# Patient Record
Sex: MALE | Race: WHITE | NOT HISPANIC OR LATINO | Employment: FULL TIME | URBAN - METROPOLITAN AREA
[De-identification: names, ages, dates, MRNs, and addresses within clinical notes are randomized per-mention and may not be internally consistent; named-entity substitution may affect disease eponyms.]

---

## 2021-06-21 ENCOUNTER — APPOINTMENT (OUTPATIENT)
Dept: GENERAL RADIOLOGY | Facility: CLINIC | Age: 61
End: 2021-06-21
Attending: FAMILY MEDICINE
Payer: OTHER MISCELLANEOUS

## 2021-06-21 ENCOUNTER — HOSPITAL ENCOUNTER (EMERGENCY)
Facility: CLINIC | Age: 61
Discharge: HOME | End: 2021-06-21
Attending: FAMILY MEDICINE | Admitting: FAMILY MEDICINE
Payer: OTHER MISCELLANEOUS

## 2021-06-21 VITALS
HEIGHT: 73 IN | SYSTOLIC BLOOD PRESSURE: 125 MMHG | RESPIRATION RATE: 18 BRPM | BODY MASS INDEX: 25.98 KG/M2 | WEIGHT: 196 LBS | OXYGEN SATURATION: 97 % | TEMPERATURE: 98.2 F | HEART RATE: 79 BPM | DIASTOLIC BLOOD PRESSURE: 79 MMHG

## 2021-06-21 DIAGNOSIS — S46.911A STRAIN OF RIGHT SHOULDER, INITIAL ENCOUNTER: ICD-10-CM

## 2021-06-21 PROCEDURE — 99283 EMERGENCY DEPT VISIT LOW MDM: CPT | Performed by: FAMILY MEDICINE

## 2021-06-21 PROCEDURE — 73030 X-RAY EXAM OF SHOULDER: CPT | Mod: RT

## 2021-06-21 PROCEDURE — 99284 EMERGENCY DEPT VISIT MOD MDM: CPT | Performed by: FAMILY MEDICINE

## 2021-06-21 RX ORDER — MULTIVITAMIN,THERAPEUTIC
1 TABLET ORAL DAILY
COMMUNITY

## 2021-06-21 RX ORDER — OXYCODONE AND ACETAMINOPHEN 5; 325 MG/1; MG/1
1 TABLET ORAL EVERY 6 HOURS PRN
Qty: 6 TABLET | Refills: 0 | Status: SHIPPED | OUTPATIENT
Start: 2021-06-21

## 2021-06-21 ASSESSMENT — MIFFLIN-ST. JEOR: SCORE: 1747.93

## 2021-06-21 NOTE — ED PROVIDER NOTES
"  History     Chief Complaint   Patient presents with     Shoulder Injury     HPI  Daniel J Gellerman is a 61 year old male who presents to the emergency department with right shoulder injury.  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  He has pain in the posterior upper shoulder region.  He particularly has pain if he tries to raise his arm over his head.  He gets a little numbness and tingling into his hand with certain arm movements.  No neck pain.  No neck injury.  No fall.  States he is usually in good health.    Allergies:  No Known Allergies    Problem List:    There are no active problems to display for this patient.       Past Medical History:    History reviewed. No pertinent past medical history.    Past Surgical History:    History reviewed. No pertinent surgical history.    Family History:    History reviewed. No pertinent family history.    Social History:  Marital Status:   [4]  Social History     Tobacco Use     Smoking status: Current Every Day Smoker     Types: Cigarettes     Smokeless tobacco: Never Used   Substance Use Topics     Alcohol use: Yes     Drug use: Yes     Types: Marijuana        Medications:    multivitamin, therapeutic (THERA-VIT) TABS tablet  oxyCODONE-acetaminophen (PERCOCET) 5-325 MG tablet          Review of Systems   All other systems reviewed and are negative.      Physical Exam   BP: 125/79  Pulse: 79  Temp: 98.2  F (36.8  C)  Resp: 18  Height: 185.4 cm (6' 1\")  Weight: 88.9 kg (196 lb)  SpO2: 97 %      Physical Exam  Vitals signs and nursing note reviewed.   Constitutional:       Comments: Alert pleasant thin 61-year-old who appears in no acute distress./79   Pulse 79   Temp 98.2  F (36.8  C) (Oral)   Resp 18   Ht 1.854 m (6' 1\")   Wt 88.9 kg (196 lb)   SpO2 97%   BMI 25.86 kg/m       HENT:      Head: Normocephalic and atraumatic.      Nose: Nose normal.   Eyes:     "  Conjunctiva/sclera: Conjunctivae normal.   Neck:      Musculoskeletal: Normal range of motion.   Cardiovascular:      Rate and Rhythm: Normal rate.      Pulses: Normal pulses.   Musculoskeletal:      Comments: Right shoulder as compared to the left.  Normal anatomic architecture and landmarks.  He has tenderness to the supraspinatus region of his right shoulder.  He is able to abduct his arm to about 90 degrees and then has discomfort in the supraspinatus region.  He is unable to fully externally rotate his shoulder due to pain.  He is able to fully internally rotate and touch the small of his back.  His bicep muscle is identical in size to the left and he has no bicep tenderness no evidence of rupture.  Distal CMS is intact.  Range of motion and motion has a nice fluid motion without any crepitus or grinding.   Skin:     General: Skin is warm and dry.      Capillary Refill: Capillary refill takes less than 2 seconds.   Neurological:      General: No focal deficit present.      Mental Status: He is oriented to person, place, and time.   Psychiatric:         Mood and Affect: Mood normal.         Behavior: Behavior normal.         ED Course        Procedures               Critical Care time:  none               Results for orders placed or performed during the hospital encounter of 06/21/21 (from the past 24 hour(s))   XR Shoulder Right G/E 3 Views    Narrative    EXAM: XR SHOULDER RIGHT G/E 3 VIEWS  LOCATION: [facility]  DATE/TIME: 6/21/2021 5:03 PM    INDICATION: Trauma, pain.  COMPARISON: None.      Impression    IMPRESSION: Mild degenerative changes glenohumeral and acromioclavicular joints. No acute fracture or dislocation.       Medications - No data to display    Assessments & Plan (with Medical Decision Making)   [facility]--61-year-old who injured his shoulder at work while pulling on a 0 turn tractor.  There was no fall.  There is no sudden load.  He has tenderness and discomfort to the supraspinatus  region and has pain and discomfort with external rotation of the right shoulder consistent with a rotator cuff strain.  Had no prior shoulder problems or injuries.  X-ray is unremarkable.  Findings discussed with patient.  Recommended ice rest and ibuprofen 6 to 800 mg 3 times a day with food.  He was given 12 tablets of Percocet to be used cautiously and not to be used with driving or used at work.  He can take 1 every 6 hours as needed for severe pain.  Gentle return of range of motion.  Follow-up with his primary care provider or sports medicine if he has ongoing problems or issues or is unable to work full duty in 3 days.  I have reviewed the nursing notes.    I have reviewed the findings, diagnosis, plan and need for follow up with the patient.       Discharge Medication List as of 6/21/2021  5:43 PM      START taking these medications    Details   oxyCODONE-acetaminophen (PERCOCET) 5-325 MG tablet Take 1 tablet by mouth every 6 hours as needed for severe pain, Disp-6 tablet, R-0, Local Print             Final diagnoses:   Strain of right shoulder, initial encounter       6/21/2021   [facility] EMERGENCY DEPT     Kathryn, Khari GARCIA MD  06/21/21 9127       Kathryn, Khari GARCIA MD  06/21/21 5829

## 2021-06-21 NOTE — LETTER
[facility] EMERGENCY DEPT  [address] [place] [address]  Phone: [phone]  Fax: [phone]    June 21, 2021        Daniel J Gellerman  [id number] [facility] [place] [address]          To whom it may concern:    RE: Daniel J Gellerman    Patient was seen and treated today at our emergency department.  I have recommended that he take today and tomorrow off work.  He can return Wednesday if his shoulder is feeling better.  Would recommend no heavy lifting for a week.  He needs follow-up with his clinic physician if he is unable to work full duty in 1 week.        Sincerely,      Khari Peralta MD

## 2021-06-21 NOTE — DISCHARGE INSTRUCTIONS
Your history and exam is consistent with a mild strain to the shoulder.  These usually involve the rotator cuff.  Ice, rest, sling if needed for discomfort.  Take ibuprofen 600 to 800 mg 3 times a day with food.  Take Percocet only as needed for severe pain.  Do not mix with alcohol driving or work.  It is constipating and addictive and you should be off of it within 2 to 3 days.

## 2024-07-05 ENCOUNTER — APPOINTMENT (OUTPATIENT)
Dept: CT IMAGING | Facility: CLINIC | Age: 64
End: 2024-07-05
Attending: EMERGENCY MEDICINE
Payer: COMMERCIAL

## 2024-07-05 ENCOUNTER — HOSPITAL ENCOUNTER (EMERGENCY)
Facility: CLINIC | Age: 64
Discharge: HOME | End: 2024-07-05
Attending: EMERGENCY MEDICINE | Admitting: EMERGENCY MEDICINE
Payer: COMMERCIAL

## 2024-07-05 VITALS
RESPIRATION RATE: 18 BRPM | HEART RATE: 60 BPM | TEMPERATURE: 97.8 F | DIASTOLIC BLOOD PRESSURE: 95 MMHG | SYSTOLIC BLOOD PRESSURE: 160 MMHG | OXYGEN SATURATION: 99 %

## 2024-07-05 DIAGNOSIS — I10 ACCELERATED HYPERTENSION: ICD-10-CM

## 2024-07-05 DIAGNOSIS — K52.9 NON-SPECIFIC COLITIS: ICD-10-CM

## 2024-07-05 LAB
ALBUMIN SERPL BCG-MCNC: 4.5 G/DL (ref 3.5–5.2)
ALBUMIN UR-MCNC: NEGATIVE MG/DL
ALP SERPL-CCNC: 76 U/L (ref 40–150)
ALT SERPL W P-5'-P-CCNC: 33 U/L (ref 0–70)
AMPHETAMINES UR QL SCN: ABNORMAL
ANION GAP SERPL CALCULATED.3IONS-SCNC: 11 MMOL/L (ref 7–15)
APPEARANCE UR: CLEAR
AST SERPL W P-5'-P-CCNC: 19 U/L (ref 0–45)
BARBITURATES UR QL SCN: ABNORMAL
BASOPHILS # BLD AUTO: 0 10E3/UL (ref 0–0.2)
BASOPHILS NFR BLD AUTO: 1 %
BENZODIAZ UR QL SCN: ABNORMAL
BILIRUB SERPL-MCNC: 0.4 MG/DL
BILIRUB UR QL STRIP: NEGATIVE
BUN SERPL-MCNC: 17.4 MG/DL (ref 8–23)
BZE UR QL SCN: ABNORMAL
CALCIUM SERPL-MCNC: 9.9 MG/DL (ref 8.8–10.2)
CANNABINOIDS UR QL SCN: ABNORMAL
CHLORIDE SERPL-SCNC: 102 MMOL/L (ref 98–107)
COLOR UR AUTO: YELLOW
CREAT SERPL-MCNC: 1.03 MG/DL (ref 0.67–1.17)
DEPRECATED HCO3 PLAS-SCNC: 25 MMOL/L (ref 22–29)
EGFRCR SERPLBLD CKD-EPI 2021: 81 ML/MIN/1.73M2
EOSINOPHIL # BLD AUTO: 0.1 10E3/UL (ref 0–0.7)
EOSINOPHIL NFR BLD AUTO: 1 %
ERYTHROCYTE [DISTWIDTH] IN BLOOD BY AUTOMATED COUNT: 13.8 % (ref 10–15)
FENTANYL UR QL: ABNORMAL
FLUAV RNA SPEC QL NAA+PROBE: NEGATIVE
FLUBV RNA RESP QL NAA+PROBE: NEGATIVE
GLUCOSE SERPL-MCNC: 106 MG/DL (ref 70–99)
GLUCOSE UR STRIP-MCNC: NEGATIVE MG/DL
HCT VFR BLD AUTO: 52.2 % (ref 40–53)
HGB BLD-MCNC: 18.8 G/DL (ref 13.3–17.7)
HGB UR QL STRIP: NEGATIVE
IMM GRANULOCYTES # BLD: 0 10E3/UL
IMM GRANULOCYTES NFR BLD: 0 %
KETONES UR STRIP-MCNC: NEGATIVE MG/DL
LEUKOCYTE ESTERASE UR QL STRIP: NEGATIVE
LYMPHOCYTES # BLD AUTO: 1.3 10E3/UL (ref 0.8–5.3)
LYMPHOCYTES NFR BLD AUTO: 20 %
MAGNESIUM SERPL-MCNC: 2.2 MG/DL (ref 1.7–2.3)
MCH RBC QN AUTO: 31.4 PG (ref 26.5–33)
MCHC RBC AUTO-ENTMCNC: 36 G/DL (ref 31.5–36.5)
MCV RBC AUTO: 87 FL (ref 78–100)
MONOCYTES # BLD AUTO: 0.4 10E3/UL (ref 0–1.3)
MONOCYTES NFR BLD AUTO: 6 %
NEUTROPHILS # BLD AUTO: 4.6 10E3/UL (ref 1.6–8.3)
NEUTROPHILS NFR BLD AUTO: 73 %
NITRATE UR QL: NEGATIVE
NRBC # BLD AUTO: 0 10E3/UL
NRBC BLD AUTO-RTO: 0 /100
OPIATES UR QL SCN: ABNORMAL
PCP QUAL URINE (ROCHE): ABNORMAL
PH UR STRIP: 7 [PH] (ref 5–7)
PLATELET # BLD AUTO: 230 10E3/UL (ref 150–450)
POTASSIUM SERPL-SCNC: 5.1 MMOL/L (ref 3.4–5.3)
PROT SERPL-MCNC: 8 G/DL (ref 6.4–8.3)
RBC # BLD AUTO: 5.98 10E6/UL (ref 4.4–5.9)
RBC URINE: <1 /HPF
RSV RNA SPEC NAA+PROBE: NEGATIVE
SARS-COV-2 RNA RESP QL NAA+PROBE: NEGATIVE
SODIUM SERPL-SCNC: 138 MMOL/L (ref 135–145)
SP GR UR STRIP: 1.01 (ref 1–1.03)
TROPONIN T SERPL HS-MCNC: 9 NG/L
TSH SERPL DL<=0.005 MIU/L-ACNC: 2.62 UIU/ML (ref 0.3–4.2)
UROBILINOGEN UR STRIP-MCNC: NORMAL MG/DL
WBC # BLD AUTO: 6.3 10E3/UL (ref 4–11)
WBC URINE: <1 /HPF

## 2024-07-05 PROCEDURE — 84484 ASSAY OF TROPONIN QUANT: CPT | Performed by: EMERGENCY MEDICINE

## 2024-07-05 PROCEDURE — 83735 ASSAY OF MAGNESIUM: CPT | Performed by: EMERGENCY MEDICINE

## 2024-07-05 PROCEDURE — 80053 COMPREHEN METABOLIC PANEL: CPT | Performed by: EMERGENCY MEDICINE

## 2024-07-05 PROCEDURE — 250N000009 HC RX 250: Performed by: EMERGENCY MEDICINE

## 2024-07-05 PROCEDURE — 99284 EMERGENCY DEPT VISIT MOD MDM: CPT | Performed by: EMERGENCY MEDICINE

## 2024-07-05 PROCEDURE — 74177 CT ABD & PELVIS W/CONTRAST: CPT

## 2024-07-05 PROCEDURE — 36415 COLL VENOUS BLD VENIPUNCTURE: CPT | Performed by: EMERGENCY MEDICINE

## 2024-07-05 PROCEDURE — 250N000011 HC RX IP 250 OP 636: Performed by: EMERGENCY MEDICINE

## 2024-07-05 PROCEDURE — 85004 AUTOMATED DIFF WBC COUNT: CPT | Performed by: EMERGENCY MEDICINE

## 2024-07-05 PROCEDURE — 96360 HYDRATION IV INFUSION INIT: CPT | Mod: 59

## 2024-07-05 PROCEDURE — 93010 ELECTROCARDIOGRAM REPORT: CPT | Performed by: EMERGENCY MEDICINE

## 2024-07-05 PROCEDURE — 93005 ELECTROCARDIOGRAM TRACING: CPT

## 2024-07-05 PROCEDURE — 87637 SARSCOV2&INF A&B&RSV AMP PRB: CPT | Performed by: EMERGENCY MEDICINE

## 2024-07-05 PROCEDURE — 81001 URINALYSIS AUTO W/SCOPE: CPT | Performed by: EMERGENCY MEDICINE

## 2024-07-05 PROCEDURE — 99285 EMERGENCY DEPT VISIT HI MDM: CPT | Mod: 25

## 2024-07-05 PROCEDURE — 258N000003 HC RX IP 258 OP 636: Performed by: EMERGENCY MEDICINE

## 2024-07-05 PROCEDURE — 80307 DRUG TEST PRSMV CHEM ANLYZR: CPT | Performed by: EMERGENCY MEDICINE

## 2024-07-05 PROCEDURE — 84443 ASSAY THYROID STIM HORMONE: CPT | Performed by: EMERGENCY MEDICINE

## 2024-07-05 RX ORDER — IOPAMIDOL 755 MG/ML
500 INJECTION, SOLUTION INTRAVASCULAR ONCE
Status: COMPLETED | OUTPATIENT
Start: 2024-07-05 | End: 2024-07-05

## 2024-07-05 RX ADMIN — SODIUM CHLORIDE 1000 ML: 9 INJECTION, SOLUTION INTRAVENOUS at 16:48

## 2024-07-05 RX ADMIN — SODIUM CHLORIDE 70 ML: 9 INJECTION, SOLUTION INTRAVENOUS at 17:07

## 2024-07-05 RX ADMIN — IOPAMIDOL 96 ML: 755 INJECTION, SOLUTION INTRAVENOUS at 17:07

## 2024-07-05 ASSESSMENT — COLUMBIA-SUICIDE SEVERITY RATING SCALE - C-SSRS
2. HAVE YOU ACTUALLY HAD ANY THOUGHTS OF KILLING YOURSELF IN THE PAST MONTH?: NO
1. IN THE PAST MONTH, HAVE YOU WISHED YOU WERE DEAD OR WISHED YOU COULD GO TO SLEEP AND NOT WAKE UP?: NO
6. HAVE YOU EVER DONE ANYTHING, STARTED TO DO ANYTHING, OR PREPARED TO DO ANYTHING TO END YOUR LIFE?: NO

## 2024-07-05 ASSESSMENT — ACTIVITIES OF DAILY LIVING (ADL)
ADLS_ACUITY_SCORE: 37
ADLS_ACUITY_SCORE: 37
ADLS_ACUITY_SCORE: 35

## 2024-07-05 NOTE — ED PROVIDER NOTES
"  History     Chief Complaint   Patient presents with    Generalized Weakness     HPI  Daniel J Gellerman is a 64 year old male who presents with concern of generalized weakness.  He says that this has been going on for \"a while\".  Feels that symptoms have gotten worse over the last 1 week.  Feels very fatigued and has little energy.  Feels that his appetite has also decreased.  Gets full very quickly if he tries to eat or drink anything.  His girlfriend, Mely, says that she is very concerned that Evin has something wrong.  She did make an appointment to see a primary provider, but he cannot see this provider until August.  He otherwise has not had a doctor for over 10 years due to insurance issues.  Don request that Evin be \"tested for cancer\".  She also is concerned that he is dehydrated and wants him admitted to the hospital.    Evin states that he has not been vomiting.  Does not think that he has been running a fever.  He is denying any chest pain or shortness of breath.  No known sick contacts.  He does have some abdominal pain but feels that it is all over.  Nothing seems to make it better or worse.  Has not had pain or burning with urination.  Had a few episodes of nonbloody diarrhea.  Is not taking any medication.  He is a smoker, smokes about half a pack per day.  Has done this for the last 20 or so years.  Occasionally will drink alcohol.  Will use marijuana, but denies any other drug use.    Allergies:  No Known Allergies    Problem List:    There are no problems to display for this patient.       Past Medical History:    No past medical history on file.    Past Surgical History:    No past surgical history on file.    Family History:    No family history on file.    Social History:  Marital Status:   [4]  Social History     Tobacco Use    Smoking status: Every Day     Types: Cigarettes    Smokeless tobacco: Never   Substance Use Topics    Alcohol use: Yes    Drug use: Yes     Types: Marijuana    "     Medications:    multivitamin, therapeutic (THERA-VIT) TABS tablet  oxyCODONE-acetaminophen (PERCOCET) 5-325 MG tablet          Review of Systems   All other systems reviewed and are negative.      Physical Exam   BP: (!) 137/97  Pulse: 74  Temp: 97.8  F (36.6  C)  Resp: 18  SpO2: 100 %      Physical Exam  Vitals and nursing note reviewed.   Constitutional:       Appearance: He is not toxic-appearing or diaphoretic.   HENT:      Head: Normocephalic.   Eyes:      Conjunctiva/sclera: Conjunctivae normal.      Pupils: Pupils are equal, round, and reactive to light.   Cardiovascular:      Rate and Rhythm: Normal rate and regular rhythm.   Pulmonary:      Effort: Pulmonary effort is normal.      Breath sounds: Normal breath sounds.   Abdominal:      General: Bowel sounds are normal.      Palpations: Abdomen is soft. There is no mass.      Tenderness: There is abdominal tenderness. There is no guarding.   Neurological:      Mental Status: He is alert.         ED Course        Procedures              EKG Interpretation:      Interpreted by Galilea Rose DO  Time reviewed: 1645  Symptoms at time of EKG: Generalized weakness  Rhythm: normal sinus   Rate: Normal  Axis: Left Axis Deviation  Ectopy: none  Conduction: right bundle branch block (incomplete)  ST Segments/ T Waves: Non-specific ST-T wave changes  Q Waves: none  Comparison to prior: No old EKG available    Clinical Impression: non-specific EKG            Critical Care time:  none               Results for orders placed or performed during the hospital encounter of 07/05/24 (from the past 24 hour(s))   UA with Microscopic reflex to Culture    Specimen: Urine, Midstream   Result Value Ref Range    Color Urine Yellow Colorless, Straw, Light Yellow, Yellow    Appearance Urine Clear Clear    Glucose Urine Negative Negative mg/dL    Bilirubin Urine Negative Negative    Ketones Urine Negative Negative mg/dL    Specific Gravity Urine 1.008 1.003 - 1.035    Blood  Urine Negative Negative    pH Urine 7.0 5.0 - 7.0    Protein Albumin Urine Negative Negative mg/dL    Urobilinogen Urine Normal Normal, 2.0 mg/dL    Nitrite Urine Negative Negative    Leukocyte Esterase Urine Negative Negative    RBC Urine <1 <=2 /HPF    WBC Urine <1 <=5 /HPF    Narrative    Urine Culture not indicated   Urine Drug Screen    Narrative    The following orders were created for panel order Urine Drug Screen.  Procedure                               Abnormality         Status                     ---------                               -----------         ------                     Urine Drug Screen Panel[[id number]]      Abnormal            Final result                 Please view results for these tests on the individual orders.   Urine Drug Screen Panel   Result Value Ref Range    Amphetamines Urine Screen Negative Screen Negative    Barbituates Urine Screen Negative Screen Negative    Benzodiazepine Urine Screen Negative Screen Negative    Cannabinoids Urine Screen Positive (A) Screen Negative    Cocaine Urine Screen Negative Screen Negative    Fentanyl Qual Urine Screen Negative Screen Negative    Opiates Urine Screen Negative Screen Negative    PCP Urine Screen Negative Screen Negative   Symptomatic Influenza A/B, RSV, & SARS-CoV2 PCR (COVID-19) Nasopharyngeal    Specimen: Nasopharyngeal; Swab   Result Value Ref Range    Influenza A PCR Negative Negative    Influenza B PCR Negative Negative    RSV PCR Negative Negative    SARS CoV2 PCR Negative Negative    Narrative    Testing was performed using the Xpert Xpress CoV2/Flu/RSV Assay on the [employer] GeneXpert Instrument. This test should be ordered for the detection of SARS-CoV-2, influenza, and RSV viruses in individuals who meet clinical and/or epidemiological criteria. Test performance is unknown in asymptomatic patients. This test is for in vitro diagnostic use under the FDA EUA for laboratories certified under CLIA to perform high or moderate  complexity testing. This test has not been FDA cleared or approved. A negative result does not rule out the presence of PCR inhibitors in the specimen or target RNA in concentration below the limit of detection for the assay. If only one viral target is positive but coinfection with multiple targets is suspected, the sample should be re-tested with another FDA cleared, approved, or authorized test, if coinfection would change clinical management. This test was validated by the [facility]. These laboratories are certified under the Clinical Laboratory Improvement Amendments of 1988 (CLIA-88) as qualified to perform high complexity laboratory testing.   CBC with platelets differential    Narrative    The following orders were created for panel order CBC with platelets differential.  Procedure                               Abnormality         Status                     ---------                               -----------         ------                     CBC with platelets and d...[[id number]]  Abnormal            Final result                 Please view results for these tests on the individual orders.   Comprehensive metabolic panel   Result Value Ref Range    Sodium 138 135 - 145 mmol/L    Potassium 5.1 3.4 - 5.3 mmol/L    Carbon Dioxide (CO2) 25 22 - 29 mmol/L    Anion Gap 11 7 - 15 mmol/L    Urea Nitrogen 17.4 8.0 - 23.0 mg/dL    Creatinine 1.03 0.67 - 1.17 mg/dL    GFR Estimate 81 >60 mL/min/1.73m2    Calcium 9.9 8.8 - 10.2 mg/dL    Chloride 102 98 - 107 mmol/L    Glucose 106 (H) 70 - 99 mg/dL    Alkaline Phosphatase 76 40 - 150 U/L    AST 19 0 - 45 U/L    ALT 33 0 - 70 U/L    Protein Total 8.0 6.4 - 8.3 g/dL    Albumin 4.5 3.5 - 5.2 g/dL    Bilirubin Total 0.4 <=1.2 mg/dL   Troponin T, High Sensitivity   Result Value Ref Range    Troponin T, High Sensitivity 9 <=22 ng/L   Magnesium   Result Value Ref Range    Magnesium 2.2 1.7 - 2.3 mg/dL   TSH with free T4 reflex   Result Value Ref Range     TSH 2.62 0.30 - 4.20 uIU/mL   CBC with platelets and differential   Result Value Ref Range    WBC Count 6.3 4.0 - 11.0 10e3/uL    RBC Count 5.98 (H) 4.40 - 5.90 10e6/uL    Hemoglobin 18.8 (H) 13.3 - 17.7 g/dL    Hematocrit 52.2 40.0 - 53.0 %    MCV 87 78 - 100 fL    MCH 31.4 26.5 - 33.0 pg    MCHC 36.0 31.5 - 36.5 g/dL    RDW 13.8 10.0 - 15.0 %    Platelet Count 230 150 - 450 10e3/uL    % Neutrophils 73 %    % Lymphocytes 20 %    % Monocytes 6 %    % Eosinophils 1 %    % Basophils 1 %    % Immature Granulocytes 0 %    NRBCs per 100 WBC 0 <1 /100    Absolute Neutrophils 4.6 1.6 - 8.3 10e3/uL    Absolute Lymphocytes 1.3 0.8 - 5.3 10e3/uL    Absolute Monocytes 0.4 0.0 - 1.3 10e3/uL    Absolute Eosinophils 0.1 0.0 - 0.7 10e3/uL    Absolute Basophils 0.0 0.0 - 0.2 10e3/uL    Absolute Immature Granulocytes 0.0 <=0.4 10e3/uL    Absolute NRBCs 0.0 10e3/uL   CT Abdomen Pelvis w Contrast    Narrative    EXAM: CT ABDOMEN PELVIS W CONTRAST  LOCATION: [facility]  DATE: 7/5/2024    INDICATION: Abdominal pain, early satiety, weight loss, nausea  COMPARISON: None.  TECHNIQUE: CT scan of the abdomen and pelvis was performed following injection of IV contrast. Multiplanar reformats were obtained. Dose reduction techniques were used.  CONTRAST: 96mLs Isovue 370    FINDINGS:   LOWER CHEST: Normal.    HEPATOBILIARY: No significant mass or bile duct dilatation. No calcified gallstones. A few tiny cysts, which do not require additional follow-up.    PANCREAS: Normal.    SPLEEN: Normal.    ADRENAL GLANDS: Normal.    KIDNEYS/BLADDER: No significant mass, stones, or hydronephrosis. There are simple or benign cysts. No follow up is needed. Circumferential urinary bladder wall thickening, but no perivesical fat stranding.    BOWEL: Mild wall thickening and fat stranding involving the sigmoid colon. Normal appendix. No dilated loops of bowel, extraluminal gas, or fluid collection.    LYMPH NODES:  "Normal.    VASCULATURE: Moderate multifocal atherosclerotic calcification of the abdominal aorta and iliofemoral arteries. Celiac axis, SMA, renal arteries, and AZAEL are patent. Patent hepatic and portomesenteric veins.    PELVIC ORGANS: Normal.    MUSCULOSKELETAL: Mild degenerative changes spine.      Impression    IMPRESSION:   1.  Mild acute colitis of the sigmoid colon, likely infectious or inflammatory. No evidence of obstruction or perforation.    2.  Urinary bladder wall thickening could be secondary to underdistention, chronic outlet obstruction, or cystitis. Correlate with symptoms.       Medications   sodium chloride 0.9% BOLUS 1,000 mL (0 mLs Intravenous Stopped 7/5/24 1744)   iopamidol (ISOVUE-370) solution 500 mL (96 mLs Intravenous $Given 7/5/24 1707)   Sodium Chloride 0.9 % bag 100mL for CT scan (70 mLs Intravenous $Given 7/5/24 1707)       Assessments & Plan (with Medical Decision Making)  Evin is a 64-year-old male presenting with concern of generalized weakness, abdominal pain, diarrhea, and fatigue.  See history and physical exam as above  64-year-old male in no acute distress, nontoxic-appearing, is mildly hypertensive with blood pressure 137/97 but otherwise vitally stable.  He is afebrile on presentation today and oxygenating at 100% on room air.  Overall, physical exam is unremarkable.  He does have some mild tenderness in his abdomen, but there is no rigidity, no rebound, no guarding, no masses appreciated.  Normal bowel sounds throughout.  Had a discussion with Evin and his girlfriend Mely.  She was requesting \"cancer testing\" and wanted patient admitted.  I explained that while there are some blood test that can be used to detect cancer or be used for monitoring purposes, this is not something that we would draw in the emergency room.  Also \"cancer testing\" is extremely broad, and there are other better ways to do this if they have a specific worry.  Did explain what I could offer here today.  " We will check CBC, CMP, UA, will get an EKG, and will plan to get a CT scan due to his abdominal pain.  Will give some IV fluids.  They are agreeable to this plan.  I did state that other preventative maintenance and screenings can be discussed with the primary provider once he is established  Labs and imaging as above.  No worrisome findings noted on blood work.  White blood cell count was within normal limits, no elevation to suggest inflammation or infectious process.  UA did not reveal any pyuria, and urine drug screen was positive for cannabis as expected.  CMP, troponin, magnesium, TSH were all within normal limits.  CT had findings of mild acute sigmoid colitis without obstruction or perforation.  There was some urinary bladder wall thickening, but given lack of symptoms and lack of findings on UA, do not think he needs to be treated for UTI.  Informed patient of these findings.  He was provided with a stool testing kit for enteric bacteria and viruses that can be brought back to the lab to further evaluate the diarrhea and the nonspecific colitis found on CT.  However, do not see an indication that the patient requires hospitalization.  He should follow with the new primary provider as he has scheduled.  ED return precautions were discussed.  Discharged in stable condition     I have reviewed the nursing notes.    I have reviewed the findings, diagnosis, plan and need for follow up with the patient.           Medical Decision Making  The patient's presentation was of low complexity (an acute and uncomplicated illness or injury).    The patient's evaluation involved:  ordering and/or review of 3+ test(s) in this encounter (see separate area of note for details)    The patient's management necessitated only low risk treatment.        Discharge Medication List as of 7/5/2024  6:18 PM          Final diagnoses:   Non-specific colitis   Accelerated hypertension       7/5/2024   [facility]  EMERGENCY DEPT       Galilea Rose,   07/05/24 0963

## 2024-07-05 NOTE — DISCHARGE INSTRUCTIONS
Blood work does not show any worrisome findings.    Your CT scan shows a very mild colitis in the end part of your colon.  This could be due to a viral illness and some mild inflammation.  You can collect a stool sample and return it to the lab with the collection kit provided.  This will tell if this is due to a specific bacteria that would need to be treated with an antibiotic.  In the meantime, will not place you on an antibiotic as this could make symptoms worse    You need to keep an appointment with your new primary provider, and can discuss any further preventative testing and other health maintenance    If you develop significant new or worsening symptoms do not hesitate to return to the emergency room for evaluation

## 2024-07-09 ENCOUNTER — LAB (OUTPATIENT)
Dept: LAB | Facility: CLINIC | Age: 64
End: 2024-07-09
Payer: COMMERCIAL

## 2024-07-09 DIAGNOSIS — K52.9 NON-SPECIFIC COLITIS: ICD-10-CM

## 2024-07-09 LAB

## 2024-07-09 PROCEDURE — 87507 IADNA-DNA/RNA PROBE TQ 12-25: CPT

## 2024-07-10 ENCOUNTER — TELEPHONE (OUTPATIENT)
Dept: NURSING | Facility: CLINIC | Age: 64
End: 2024-07-10
Payer: COMMERCIAL

## 2024-07-10 NOTE — TELEPHONE ENCOUNTER
[facility]    Reason for call: Lab Result Notification     Lab Result (including Rx patient on, if applicable).  If culture, copy of lab report at bottom.  Lab Result:   Component      Latest Ref Rng 7/9/2024  1:10 PM   YERSINIA ENTEROCOLITICA      Negative  Positive !       Legend:  ! Abnormal    Creatinine Level (mg/dl)   Creatinine   Date Value Ref Range Status   07/05/2024 1.03 0.67 - 1.17 mg/dL Final    Creatinine clearance (ml/min), if applicable    Creatinine clearance cannot be calculated (Unknown ideal weight.)     Patient's current Symptoms:   At 4:45PM, Per his girlfirendMely,  (Verbal consent from Evin to speak with Mely).  He is drinking fluids well today  No c/o diarrhea  He is feeling better today    RN Recommendations/Instructions per [facility] ED lab result protocol:   [facility] ED lab result protocol utilized: Yersinia    Patient/care giver notified to contact your PCP clinic or return to the Emergency department if your:  Symptoms worsen or other concerning symptoms.    Ted Thurston RN

## 2024-07-10 NOTE — TELEPHONE ENCOUNTER
[facility]    Reason for call: Lab Result Notification     Lab Result (including Rx patient on, if applicable).  If culture, copy of lab report at bottom.  Lab Result: Enteric Bacteria    Component      Latest Ref Rng 7/9/2024  1:10 PM   YERSINIA ENTEROCOLITICA      Negative  Positive !       Legend:  ! Abnormal    Creatinine Level (mg/dl)   Creatinine   Date Value Ref Range Status   07/05/2024 1.03 0.67 - 1.17 mg/dL Final    Creatinine clearance (ml/min), if applicable    Creatinine clearance cannot be calculated (Unknown ideal weight.)     Patient presented to [facility] ED on 7/5/24 with generalized weakness and decreased appetite.     RN Recommendations/Instructions per [facility] ED lab result protocol:   [facility] ED lab result protocol utilized: Enteric Bacteria and Viruses Protocol - Yersiniosis    Unable to reach patient/caregiver.     Left voicemail message requesting a call back to [phone] between 9 a.m. and 5:30 p.m. for patient's ED/UC lab results.      Letter pended to be sent via [employer] mail.     Nori Sales RN

## 2024-07-10 NOTE — LETTER
July 10, 2024        Daniel J Gellerman  [address] [address] [address]          Dear Daniel Gellerman:    You were seen in the [facility] Emergency Department at [facility] on 7/5/2024.  We are unable to reach you by phone, so we are sending you this letter.     It is important that you call [facility] Emergency Department lab result nurse at [phone], as we have information to relay to you AND/OR we MAY have to make some changes in your treatment.    Best time to call back is between 9AM and 5:30PM, 7 days a week.      Sincerely,     [facility] Emergency Department Lab Result RN  [phone]

## 2024-08-06 ENCOUNTER — OFFICE VISIT (OUTPATIENT)
Dept: FAMILY MEDICINE | Facility: CLINIC | Age: 64
End: 2024-08-06
Payer: COMMERCIAL

## 2024-08-06 VITALS
TEMPERATURE: 97.3 F | WEIGHT: 184 LBS | HEART RATE: 72 BPM | HEIGHT: 72 IN | DIASTOLIC BLOOD PRESSURE: 72 MMHG | SYSTOLIC BLOOD PRESSURE: 130 MMHG | OXYGEN SATURATION: 98 % | RESPIRATION RATE: 18 BRPM | BODY MASS INDEX: 24.92 KG/M2

## 2024-08-06 DIAGNOSIS — Z87.891 PERSONAL HISTORY OF TOBACCO USE: ICD-10-CM

## 2024-08-06 DIAGNOSIS — R63.4 UNINTENTIONAL WEIGHT LOSS: ICD-10-CM

## 2024-08-06 DIAGNOSIS — F17.200 NICOTINE DEPENDENCE, UNCOMPLICATED, UNSPECIFIED NICOTINE PRODUCT TYPE: ICD-10-CM

## 2024-08-06 DIAGNOSIS — Z11.4 SCREENING FOR HIV (HUMAN IMMUNODEFICIENCY VIRUS): ICD-10-CM

## 2024-08-06 DIAGNOSIS — Z11.59 NEED FOR HEPATITIS C SCREENING TEST: ICD-10-CM

## 2024-08-06 DIAGNOSIS — R23.3 EASY BRUISING: ICD-10-CM

## 2024-08-06 DIAGNOSIS — Z12.11 SCREEN FOR COLON CANCER: Primary | ICD-10-CM

## 2024-08-06 DIAGNOSIS — H54.7 LOW VISION, UNSPECIFIED LEFT EYE VISUAL IMPAIRMENT CATEGORY, UNSPECIFIED RIGHT EYE VISUAL IMPAIRMENT CATEGORY: ICD-10-CM

## 2024-08-06 LAB
ALBUMIN SERPL BCG-MCNC: 4.5 G/DL (ref 3.5–5.2)
ALP SERPL-CCNC: 83 U/L (ref 40–150)
ALT SERPL W P-5'-P-CCNC: 37 U/L (ref 0–70)
ANION GAP SERPL CALCULATED.3IONS-SCNC: 12 MMOL/L (ref 7–15)
AST SERPL W P-5'-P-CCNC: 21 U/L (ref 0–45)
BILIRUB SERPL-MCNC: 0.3 MG/DL
BUN SERPL-MCNC: 12 MG/DL (ref 8–23)
CALCIUM SERPL-MCNC: 9.7 MG/DL (ref 8.8–10.4)
CHLORIDE SERPL-SCNC: 101 MMOL/L (ref 98–107)
CHOLEST SERPL-MCNC: 310 MG/DL
CREAT SERPL-MCNC: 1.01 MG/DL (ref 0.67–1.17)
EGFRCR SERPLBLD CKD-EPI 2021: 83 ML/MIN/1.73M2
ERYTHROCYTE [DISTWIDTH] IN BLOOD BY AUTOMATED COUNT: 13.9 % (ref 10–15)
FASTING STATUS PATIENT QL REPORTED: YES
FASTING STATUS PATIENT QL REPORTED: YES
GLUCOSE SERPL-MCNC: 94 MG/DL (ref 70–99)
HBA1C MFR BLD: 5.5 %
HCO3 SERPL-SCNC: 26 MMOL/L (ref 22–29)
HCT VFR BLD AUTO: 49.4 % (ref 40–53)
HDLC SERPL-MCNC: 58 MG/DL
HGB BLD-MCNC: 17.4 G/DL (ref 13.3–17.7)
LDLC SERPL CALC-MCNC: 221 MG/DL
MCH RBC QN AUTO: 31.3 PG (ref 26.5–33)
MCHC RBC AUTO-ENTMCNC: 35.2 G/DL (ref 31.5–36.5)
MCV RBC AUTO: 89 FL (ref 78–100)
NONHDLC SERPL-MCNC: 252 MG/DL
PLATELET # BLD AUTO: 203 10E3/UL (ref 150–450)
POTASSIUM SERPL-SCNC: 4.9 MMOL/L (ref 3.4–5.3)
PROT SERPL-MCNC: 8 G/DL (ref 6.4–8.3)
PSA SERPL DL<=0.01 NG/ML-MCNC: 0.81 NG/ML (ref 0–4.5)
RBC # BLD AUTO: 5.56 10E6/UL (ref 4.4–5.9)
SODIUM SERPL-SCNC: 139 MMOL/L (ref 135–145)
TRIGL SERPL-MCNC: 156 MG/DL
TSH SERPL DL<=0.005 MIU/L-ACNC: 3.86 UIU/ML (ref 0.3–4.2)
WBC # BLD AUTO: 5.6 10E3/UL (ref 4–11)

## 2024-08-06 PROCEDURE — 90471 IMMUNIZATION ADMIN: CPT | Performed by: FAMILY MEDICINE

## 2024-08-06 PROCEDURE — 86780 TREPONEMA PALLIDUM: CPT | Performed by: FAMILY MEDICINE

## 2024-08-06 PROCEDURE — 90715 TDAP VACCINE 7 YRS/> IM: CPT | Performed by: FAMILY MEDICINE

## 2024-08-06 PROCEDURE — G0103 PSA SCREENING: HCPCS | Performed by: FAMILY MEDICINE

## 2024-08-06 PROCEDURE — 84443 ASSAY THYROID STIM HORMONE: CPT | Performed by: FAMILY MEDICINE

## 2024-08-06 PROCEDURE — 80053 COMPREHEN METABOLIC PANEL: CPT | Performed by: FAMILY MEDICINE

## 2024-08-06 PROCEDURE — 86803 HEPATITIS C AB TEST: CPT | Performed by: FAMILY MEDICINE

## 2024-08-06 PROCEDURE — 85027 COMPLETE CBC AUTOMATED: CPT | Performed by: FAMILY MEDICINE

## 2024-08-06 PROCEDURE — 87389 HIV-1 AG W/HIV-1&-2 AB AG IA: CPT | Performed by: FAMILY MEDICINE

## 2024-08-06 PROCEDURE — G0296 VISIT TO DETERM LDCT ELIG: HCPCS | Performed by: FAMILY MEDICINE

## 2024-08-06 PROCEDURE — 36415 COLL VENOUS BLD VENIPUNCTURE: CPT | Performed by: FAMILY MEDICINE

## 2024-08-06 PROCEDURE — 83036 HEMOGLOBIN GLYCOSYLATED A1C: CPT | Performed by: FAMILY MEDICINE

## 2024-08-06 PROCEDURE — 99204 OFFICE O/P NEW MOD 45 MIN: CPT | Mod: 25 | Performed by: FAMILY MEDICINE

## 2024-08-06 PROCEDURE — 80061 LIPID PANEL: CPT | Performed by: FAMILY MEDICINE

## 2024-08-06 PROCEDURE — 82607 VITAMIN B-12: CPT | Performed by: FAMILY MEDICINE

## 2024-08-06 PROCEDURE — 96127 BRIEF EMOTIONAL/BEHAV ASSMT: CPT | Performed by: FAMILY MEDICINE

## 2024-08-06 ASSESSMENT — PATIENT HEALTH QUESTIONNAIRE - PHQ9
SUM OF ALL RESPONSES TO PHQ QUESTIONS 1-9: 13
10. IF YOU CHECKED OFF ANY PROBLEMS, HOW DIFFICULT HAVE THESE PROBLEMS MADE IT FOR YOU TO DO YOUR WORK, TAKE CARE OF THINGS AT HOME, OR GET ALONG WITH OTHER PEOPLE: SOMEWHAT DIFFICULT
SUM OF ALL RESPONSES TO PHQ QUESTIONS 1-9: 13

## 2024-08-06 ASSESSMENT — PAIN SCALES - GENERAL: PAINLEVEL: MILD PAIN (3)

## 2024-08-06 NOTE — Clinical Note
Labs reviewed. High cholesterol but otherwise benign looking. Can you call pt and let him know his cholesterol is elevated and we recommend low fat diet with lower red meats and reduced processed meats. Otherwise labs look normal.

## 2024-08-06 NOTE — PROGRESS NOTES
Assessment & Plan         (R63.4) Unintentional weight loss  Comment: Patient is a 64-year-old male who presents after long lapse in care here today to establish care and to seek evaluation for unintentional weight loss.  He reports that he lost approximately 40 pounds in the last year without changing any diet or exercise.  He does smoke.  Reports no clear dieting and no exercise patterns although he does work in his shop at home.  He also presents today with a note from his partner who reports that he has some difficulties with ambulation and stumbling.  He denies this in the room.  Questionable history of memory deficit per his wife's report.  Unfortunately she is not here with him today to elucidate more.  He adamantly denies this.  His gait appeared to be normal on exam today and he was able to recall 3 words after 5-minute labs.  Will continue to monitor for signs or symptoms.  He was offered but declined referral to neurology today  Plan: Lipid panel reflex to direct LDL Non-fasting,         CBC with platelets, Comprehensive metabolic         panel (BMP + Alb, Alk Phos, ALT, AST, Total.         Bili, TP), Hemoglobin A1c, TSH with free T4         reflex, PSA, screen, Treponema Abs w Reflex to         RPR and Titer, Vitamin B12    (R23.3) Easy bruising  Comment: Will need to check blood counts and we did discuss doing CBC as well as CMP.  He does have a history of drinking as well as smoking.  Cannot rule out underlying malignancy.  Given his smoking pattern malignancy with a definite possibility.  Will need to recheck blood counts    (F17.200) Nicotine dependence, uncomplicated, unspecified nicotine product type  Comment: Is not interested in cessation at this time but we did discuss doing a referral for low-dose CT and patient with.  Amenable for that  Plan: SMOKING CESSATION COUNSELING 3-10 MIN, Prof         fee: Shared Decision Making for Lung Cancer         Screening, CT Chest Lung Cancer Scrn Low Dose          wo  (Z12.11) Screen for colon cancer  (primary encounter diagnosis)  Comment: Referral done for colonoscopy given weight loss  Plan: Colonoscopy Screening  Referral    (Z11.4) Screening for HIV (human immunodeficiency virus)  Plan: HIV Antigen Antibody Combo    (Z11.59) Need for hepatitis C screening test  Plan: Hepatitis C Screen Reflex to HCV RNA Quant and         Genotype    (Z87.891) Personal history of tobacco use      (H54.7) Low vision, unspecified left eye visual impairment category, unspecified right eye visual impairment category  Patient offered but declined referral for eye exam.  Will do an adult well visit        Nicotine/Tobacco Cessation  He reports that he has been smoking cigarettes. He has never used smokeless tobacco.  Nicotine/Tobacco Cessation Plan  Information offered: Patient not interested at this time      Depression Screening Follow Up        8/6/2024     2:57 PM   PHQ   PHQ-9 Total Score 13   Q9: Thoughts of better off dead/self-harm past 2 weeks Not at all         8/6/2024     2:57 PM   Last PHQ-9   1.  Little interest or pleasure in doing things 2   2.  Feeling down, depressed, or hopeless 2   3.  Trouble falling or staying asleep, or sleeping too much 2   4.  Feeling tired or having little energy 2   5.  Poor appetite or overeating 1   6.  Feeling bad about yourself 1   7.  Trouble concentrating 2   8.  Moving slowly or restless 1   Q9: Thoughts of better off dead/self-harm past 2 weeks 0   PHQ-9 Total Score 13         Follow Up Actions Taken  Crisis resource information provided in After Visit Summary  Patient counseled, no additional follow up at this time.       Needs labs and follow-up on referrals    Yaw   Evin is a 64 year old, presenting for the following health issues:  Establish Care      8/6/2024     3:16 PM   Additional Questions   Roomed by Crystal     Patient presents today to establish care.  He denies any current signs or symptoms of issues.  He  "presents today as well with a handwritten note from his partner requesting evaluation due to unintentional weight loss.  When challenged with this information patient reports that he is amenable to workup including colonoscopy and lab workup.  There is also note there that he is experiencing some memory deficit and some issues with mood.  He adamantly denies this and says that his wife \"thinks this about everybody\".    History of Present Illness       Reason for visit:  Find primary doctor    He eats 2-3 servings of fruits and vegetables daily.He consumes 3 sweetened beverage(s) daily.He exercises with enough effort to increase his heart rate 30 to 60 minutes per day.  He exercises with enough effort to increase his heart rate 5 days per week.   He is taking medications regularly.             Objective    /72   Pulse 72   Temp 97.3  F (36.3  C) (Temporal)   Resp 18   Ht 1.832 m (6' 0.13\")   Wt 83.5 kg (184 lb)   SpO2 98%   BMI 24.87 kg/m    Body mass index is 24.87 kg/m .  Physical Exam  Constitutional:       General: He is not in acute distress.     Appearance: He is not ill-appearing, toxic-appearing or diaphoretic.   HENT:      Head: Normocephalic.      Right Ear: Tympanic membrane normal.      Left Ear: Tympanic membrane normal.      Mouth/Throat:      Mouth: Mucous membranes are moist.   Cardiovascular:      Rate and Rhythm: Normal rate and regular rhythm.      Pulses: Normal pulses.      Heart sounds: No murmur heard.  Pulmonary:      Effort: Pulmonary effort is normal. No respiratory distress.      Breath sounds: Normal breath sounds. No stridor. No wheezing, rhonchi or rales.   Abdominal:      General: Abdomen is flat.      Palpations: Abdomen is soft.   Skin:     General: Skin is warm and dry.      Capillary Refill: Capillary refill takes less than 2 seconds.   Neurological:      Mental Status: He is alert and oriented to person, place, and time. Mental status is at baseline.      Cranial " Nerves: No cranial nerve deficit.      Coordination: Coordination normal.      Gait: Gait normal.   Psychiatric:         Mood and Affect: Mood normal.         Behavior: Behavior normal.         Thought Content: Thought content normal.         Judgment: Judgment normal.                Signed Electronically by: Sonia Dang MD    Lung Cancer Screening Shared Decision Making Visit     Daniel J Gellerman, a 64 year old male, is eligible for lung cancer screening    History   Smoking Status    Every Day    Types: Cigarettes   Smokeless Tobacco    Never       I have discussed with patient the risks and benefits of screening for lung cancer with low-dose CT.     The risks include:    radiation exposure: one low dose chest CT has as much ionizing radiation as about 15 chest x-rays, or 6 months of background radiation living in [place]      false positives: most findings/nodules are NOT cancer, but some might still require additional diagnostic evaluation, including biopsy    over-diagnosis: some slow growing cancers that might never have been clinically significant will be detected and treated unnecessarily     The benefit of early detection of lung cancer is contingent upon adherence to annual screening or more frequent follow up if indicated.     Furthermore, to benefit from screening, Anish must be willing and able to undergo diagnostic procedures, if indicated. Although no specific guide is available for determining severity of comorbidities, it is reasonable to withhold screening in patients who have greater mortality risk from other diseases.     We did discuss that the best way to prevent lung cancer is to not smoke.    Some patients may value a numeric estimation of lung cancer risk when evaluating if lung cancer screening is right for them, here is one calculator:    ShouldIScreen

## 2024-08-06 NOTE — PATIENT INSTRUCTIONS
Lung Cancer Screening   Frequently Asked Questions  If you are at high-risk for lung cancer, getting screened with low-dose computed tomography (LDCT) every year can help save your life. This handout offers answers to some of the most common questions about lung cancer screening. If you have other questions, please call [phone] ([phone]).     What is it?  Lung cancer screening uses special X-ray technology to create an image of your lung tissue. The exam is quick and easy and takes less than 10 seconds. We don t give you any medicine or use any needles. You can eat before and after the exam. You don t need to change your clothes as long as the clothing on your chest doesn t contain metal. But, you do need to be able to hold your breath for at least 6 seconds during the exam.    What is the goal of lung cancer screening?  The goal of lung cancer screening is to save lives. Many times, lung cancer is not found until a person starts having physical symptoms. Lung cancer screening can help detect lung cancer in the earliest stages when it may be easier to treat.    Who should be screened for lung cancer?  We suggest lung cancer screening for anyone who is at high-risk for lung cancer. You are in the high-risk group if you:      are between the ages of 55 and 79, and    have smoked at least 1 pack of cigarettes a day for 20 or more years, and    still smoke or have quit within the past 15 years.    However, if you have a new cough or shortness of breath, you should talk to your doctor before being screened.    Why does it matter if I have symptoms?  Certain symptoms can be a sign that you have a condition in your lungs that should be checked and treated by your doctor. These symptoms include fever, chest pain, a new or changing cough, shortness of breath that you have never felt before, coughing up blood or unexplained weight loss. Having any of these symptoms can greatly affect the results of lung  cancer screening.       Should all smokers get an LDCT lung cancer screening exam?  It depends. Lung cancer screening is for a very specific group of men and women who have a history of heavy smoking over a long period of time (see  Who should be screened for lung cancer  above).  I am in the high-risk group, but have been diagnosed with cancer in the past. Is LDCT lung cancer screening right for me?  In some cases, you should not have LDCT lung screening, such as when your doctor is already following your cancer with CT scan studies. Your doctor will help you decide if LDCT lung screening is right for you.  Do I need to have a screening exam every year?  Yes. If you are in the high-risk group described earlier, you should get an LDCT lung cancer screening exam every year until you are 79, or are no longer willing or able to undergo screening and possible procedures to diagnose and treat lung cancer.  How effective is LDCT at preventing death from lung cancer?  Studies have shown that LDCT lung cancer screening can lower the risk of death from lung cancer by 20 percent in people who are at high-risk.  What are the risks?  There are some risks and limitations of LDCT lung cancer screening. We want to make sure you understand the risks and benefits, so please let us know if you have any questions. Your doctor may want to talk with you more about these risks.    Radiation exposure: As with any exam that uses radiation, there is a very small increased risk of cancer. The amount of radiation in LDCT is small--about the same amount a person would get from a mammogram. Your doctor orders the exam when he or she feels the potential benefits outweigh the risks.    False negatives: No test is perfect, including LDCT. It is possible that you may have a medical condition, including lung cancer, that is not found during your exam. This is called a false negative result.    False positives and more testing: LDCT very often finds  something in the lung that could be cancer, but in fact is not. This is called a false positive result. False positive tests often cause anxiety. To make sure these findings are not cancer, you may need to have more tests. These tests will be done only if you give us permission. Sometimes patients need a treatment that can have side effects, such as a biopsy. For more information on false positives, see  What can I expect from the results?     Findings not related to lung cancer: Your LDCT exam also takes pictures of areas of your body next to your lungs. In a very small number of cases, the CT scan will show an abnormal finding in one of these areas, such as your kidneys, adrenal glands, liver or thyroid. This finding may not be serious, but you may need more tests. Your doctor can help you decide what other tests you may need, if any.  What can I expect from the results?  About 1 out of 4 LDCT exams will find something that may need more tests. Most of the time, these findings are lung nodules. Lung nodules are very small collections of tissue in the lung. These nodules are very common, and the vast majority--more than 97 percent--are not cancer (benign). Most are normal lymph nodes or small areas of scarring from past infections.  But, if a small lung nodule is found to be cancer, the cancer can be cured more than 90 percent of the time. To know if the nodule is cancer, we may need to get more images before your next yearly screening exam. If the nodule has suspicious features (for example, it is large, has an odd shape or grows over time), we will refer you to a specialist for further testing.  Will my doctor also get the results?  Yes. Your doctor will get a copy of your results.  Is it okay to keep smoking now that there s a cancer screening exam?  No. Tobacco is one of the strongest cancer-causing agents. It causes not only lung cancer, but other cancers and cardiovascular (heart) diseases as well. The damage  caused by smoking builds over time. This means that the longer you smoke, the higher your risk of disease. While it is never too late to quit, the sooner you quit, the better.  Where can I find help to quit smoking?  The best way to prevent lung cancer is to stop smoking. If you have already quit smoking, congratulations and keep it up! For help on quitting smoking, please call [place] at 1-295-QUITNOW ([phone]) or the [employer] at 1-449.856.5881 to find local resources near you.  One-on-one health coaching:  If you d prefer to work individually with a health care provider on tobacco cessation, we offer:      Medication Therapy Management:  Our specially trained pharmacists work closely with you and your doctor to help you quit smoking.  Call [phone] or [phone] (toll free).

## 2024-08-07 LAB
HCV AB SERPL QL IA: NONREACTIVE
HIV 1+2 AB+HIV1 P24 AG SERPL QL IA: NONREACTIVE
T PALLIDUM AB SER QL: NONREACTIVE
VIT B12 SERPL-MCNC: 807 PG/ML (ref 232–1245)

## 2024-08-07 NOTE — PROGRESS NOTES
Labs reviewed. High cholesterol but otherwise benign looking. Can you call pt and let him know his cholesterol is elevated and we recommend low fat diet with lower red meats and reduced processed meats. Otherwise labs look normal.   Answers submitted by the patient for this visit:  Patient Health Questionnaire (Submitted on 8/6/2024)  If you checked off any problems, how difficult have these problems made it for you to do your work, take care of things at home, or get along with other people?: Somewhat difficult  PHQ9 TOTAL SCORE: 13  General Questionnaire (Submitted on 8/6/2024)  Chief Complaint: Chronic problems general questions HPI Form  What is the reason for your visit today? : find primary doctor  How many servings of fruits and vegetables do you eat daily?: 2-3  On average, how many sweetened beverages do you drink each day (Examples: soda, juice, sweet tea, etc.  Do NOT count diet or artificially sweetened beverages)?: 3  How many minutes a day do you exercise enough to make your heart beat faster?: 30 to 60  How many days a week do you exercise enough to make your heart beat faster?: 5  How many days per week do you miss taking your medication?: 0

## 2024-08-08 ENCOUNTER — TELEPHONE (OUTPATIENT)
Dept: FAMILY MEDICINE | Facility: CLINIC | Age: 64
End: 2024-08-08
Payer: COMMERCIAL

## 2024-08-08 NOTE — TELEPHONE ENCOUNTER
Sonia Dang MD  [facility]    Labs reviewed. High cholesterol but otherwise benign looking. Can you call pt and let him know his cholesterol is elevated and we recommend low fat diet with lower red meats and reduced processed meats. Otherwise labs look normal.      Attempted to contact patient. No answer and unable to leave message. No one picked up and was silent when stopped ringing. Will need to retry to get ahold of patient and relay above message.       Rachna Frederick LPN

## 2024-08-09 ENCOUNTER — HOSPITAL ENCOUNTER (OUTPATIENT)
Facility: CLINIC | Age: 64
End: 2024-08-09
Attending: SURGERY | Admitting: SURGERY
Payer: COMMERCIAL

## 2024-08-09 ENCOUNTER — TELEPHONE (OUTPATIENT)
Dept: GASTROENTEROLOGY | Facility: CLINIC | Age: 64
End: 2024-08-09
Payer: COMMERCIAL

## 2024-08-09 NOTE — LETTER
August 12, 2024      Daniel J Gellerman  [id number] [facility]  [place] [address]              Dear Evin,    Hilary Miralax Bowel Prep   Prep instructions for your colonoscopy   For prep questions, please call: [facility] - [address] , [place] [address] - [phone]    Please read these instructions carefully at least 7 days prior to your colonoscopy procedure. Be sure to follow all directions completely. The inside of your colon must be clean to allow for a complete examination for the presence of any growths, polyps, and/or abnormalities, as well as their biopsy or removal. A number of tips are included in order to make this part of the procedure as comfortable as possible.    Getting ready   Purchase the following items over-the-counter/off the shelf at the drug store:    Four (4) - Dulcolax laxative (Bisacodyl) 5mg tablets (Do not use Dulcolax stool softener)   8.3 ounce bottle of Miralax powder (ClearLAX, SmoothLAX, PowderLAX)  64 ounces of Gatorade or similar sports drink. Not red or purple. (Pedialyte, Propel, Gatorade G2/Zero, Powerade, Powerade Zero)   10 ounce bottle of clear Magnesium Citrate    A nurse will call you to go over your appointment details and prep instructions. Not completing the nurse call could result with your appointment being cancelled.    You must arrange for an adult to drive you home after your exam. Your colonoscopy cannot be done unless you have a ride. If you need to use public transportation, someone must ride with you and stay with you for up to 24 hours.       7 days before procedure     Consult with your prescribing provider about stopping any:     Diabetic/Weight Loss Injectable Medication GLP-1 agonist (such as Exenatide (Byetta, Bydureon),  Mounjaro (Tirzepatide). Ozempic (Semaglutide). Semaglutide. Symlin (Pamlintide), Tanzeum (Albiglutide). Tirzepatide-Weight Management (Zepbound), Trulicity (Dulaglutide), Victoza (Saxenda, Liraglutide), Wegovy (Semaglutide)  please follow below guidelines for holding:    For weekly injection HOLD 7 days before procedure.  For once or twice a day injection HOLD the day before procedure and day of procedure.  For oral, daily dosing (Rybelsus) HOLD 7 days before procedure.    Blood thinning and/or anti platelet medications: such as Coumadin, Plavix, Xarelto, Eliquis, Lovenox or others, these medications may need to be stopped temporarily before your procedure.     If you take insulin for diabetes, ask your prescribing provider for instructions on how to take this medication while preparing for a colonoscopy.     NSAIDs medications such as Sulindac, Celebrex, Mobic, Relafen or others may need to be stopped before the procedure. This will be discussed during nurse review call, or you can reach out to your prescribing provider.      Stop taking iron (ferrous sulfate), multivitamins that contain iron, and/or fiber supplements (Metamucil, Benefiber, Psyllium husk powder, Fibercon, etc.).      Stop eating whole kernel corn, popcorn, nuts, and foods that contain seeds. These can stay in the colon for many days, and they can clog up the colonoscope.       3 days before procedure     Begin a low-fiber diet (see examples below). No Olestra (a fat substitute).    Consume no more than 10-15 grams of fiber each day.     It is important to stay hydrated. Drink at least eight 8-ounce glasses of water a day.      LOW FIBER DIET   You can have:   Do not have:    Starches: White bread, rolls, biscuits, croissants, Katherine toast, white flour tortillas, waffles, pancakes, Maltese toast; white rice, noodles, pasta, macaroni; cooked and peeled potatoes; plain crackers, saltines; cooked farina or cream of rice; puffed rice, corn flakes, Rice Krispies, Special K      Vegetables: tender cooked and canned, vegetable broths     Fruits and fruit juices: Strained fruit juice, canned fruit without seeds or skin (not pineapple), applesauce, pear sauce, ripe bananas, melons  (not watermelon)     Milk products: Milk (plain or flavored), cheese, cottage cheese, yogurt (no berries), custard, ice cream       Proteins: Tender, well-cooked ground beef, lamb, veal, ham, pork, chicken, turkey, fish or organ meat, Tofu, eggs, creamy peanut butter      Fats and condiments:  Margarine, butter, oils, mayonnaise, sour cream, salad dressing, plain gravy; spices, cooked herbs; sugar, clear jelly, honey, syrup      Snacks, sweets and drinks: Pretzels, hard candy; plain cakes and cookies (no nuts or seeds); gelatin, plain pudding, sherbet, Popsicles; coffee, tea, carbonated ( fizzy ) drinks  Starches: Breads or rolls that contain nuts, seeds or fruit; whole wheat or whole grain breads that contain more than 2 grams of fiber per serving; cornbread; corn or whole wheat tortillas; potatoes with skin; brown rice, wild rice, quinoa, kasha (buckwheat), and oatmeal      Vegetables: Any raw or steamed vegetables; vegetables with seeds; corn in any form      Fruits and fruit juices: Prunes, prune juice, raisins and other dried fruits, berries and other fruits with seeds, canned pineapple juices with pulp such as orange, grapefruit, pineapple or tomato juice     Milk products: Any yogurt with nuts, seeds or berries      Proteins: Tough, fibrous meats with gristle; cooked dried beans, peas or lentils; crunchy peanut butter     Fats and condiments: Pickles, olives, relish, horseradish; jam, marmalade, preserves      Snacks, sweets and drinks: Popcorn, nuts, seeds, granola, coconut, candies made with nuts or seeds; all desserts that contain nuts, seeds, raisins and other dried fruits, coconut, whole grains or bran.       1 day before procedure       Start a clear liquid diet (see examples below). Do not eat any solid food.      Drink at least eight to ten 8-ounce glasses of water throughout the day. ? ? ? ? ? ? ? ?    Stop taking NSAIDs pain relievers, such as Advil, Ibuprofen, Motrin, etc. You may take  Tylenol.      CLEAR LIQUID DIET:  You can have: Do not have:    Water, tea, coffee (no milk or cream)   Soda pop, Gatorade (not red or purple)   Coconut water   Jell-O, Popsicles (no milk or fruit pieces - not red or purple)   Fat-free soup broth or bouillon   Plain hard candy, such as clear life savers (not red or purple)   Clear juices and fruit-flavored drinks, such as apple juice, white grape juice, Hi-C, and Francisco-Aid (not red or purple)  Milk or milk products such as ice cream, malts or shakes, or coffee creamer   Red or purple drinks of any kind such as cranberry juice, grape juice or Francisco-Aid. Avoid red or purple Jell-O, Popsicles, sorbet, sherbet and candy   Juices with pulp such as orange, grapefruit, pineapple or tomato juice   Cream soups of any kind   Alcohol and beer   Protein drinks or protein powder     Step 1     At 4 PM, take 2 Dulcolax (Bisacodyl) tablets.   At 5 PM, mix the entire bottle of Miralax with 64 ounces of Gatorade in a pitcher and stir to dissolve the powder. Start drinking one 8-ounce glass of the Miralax and Gatorade mixture every 15 minutes until the pitcher is HALF empty (about 4 glasses).  Drink each glass quickly. Store the rest in the refrigerator.   Continue to drink clear liquids.    Step 2     At 10 PM, take 2 Dulcolax (Bisacodyl) tablets  At 10 PM start drinking the remainder of the Miralax and Gatorade mixture. Drink one 8-ounce glass of Miralax and Gatorade mixture every 15 minutes until the pitcher is empty (about 4 glasses). Drink each glass quickly.     Step 3     If you arrive for your procedure BEFORE 11 AM:  6 hours prior to your scheduled arrival to the endoscopy unit, drink 10 ounces of clear Magnesium Citrate.    If you arrive for your procedure AFTER 11 AM:  At 6 AM on the day of the exam drink 10 ounces of clear Magnesium Citrate.       Reminders While Drinking Laxatives:     After you start drinking the solution, stay near a toilet. You may have watery stools  (diarrhea), mild cramping, bloating, and nausea. You may want to use Vaseline on the skin around your anus after each bowel movement or use wet wipes to prevent irritation. Bowel movements will be liquid and dark in color at first and then should turn clear yellow in color.      Some find it easier to drink the Miralax and Gatorade mixture when it is chilled. Do not add ice as this will dilute the laxative. Drinking from a straw can be helpful to drink the liquid faster.     If you have nausea or vomiting during drinking the solution, rinse your mouth with water and take a 15-30 minute break and then continue drinking solution.       Day of procedure     2 hours before your arrival time stop drinking all liquids, including water.   Do not smoke or swallow anything, including water or gum for at least 2 hours before your arrival time. This is a safety issue. Your procedure could be cancelled if you do not follow directions.  No chewing tobacco 6 hours prior to procedure arrival time.     You may take your necessary morning medications with sips of water (4 ounces).   Do not take diabetes medicine by mouth until after your exam.  If you have asthma, bring your inhalers.  Please perform your nebulizer treatments and airway clearance therapy in the morning prior to the procedure (if applicable).    Arrive with a responsible adult who can drive you home and stay with you for up to 24 hours. The medications used during the procedure will make you sleepy, so you won't be able to drive yourself home.   You cannot use public transportation, ride-share services, or non-medical taxi services without a responsible caregiver. Medical transport services are okay, but a caregiver must be there to receive you at your destination.  Please check in with your  when you arrive. Drivers should stay on campus.    Expect to be at the procedure center for about 1.5-2.5 hours.    Do not wear jewelry (i.e. earrings, rings, necklaces,  watches, etc.). Leave your purse, billfold, credit cards, and other valuables at home.      Bring insurance card and ID.       Answers to Commonly Asked Questions     How soon can I eat after the procedure?  You may resume your normal diet when you feel ready, unless advised otherwise by the doctor performing your procedure. We recommend starting with a light meal.   Do not drink alcohol for 24 hours after your procedure.  You may resume normal activities (work, exercise, etc.) after 24 hours.    How might I feel after the procedure?  It is normal to feel bloated and gassy after your procedure. Walking will help move the air through your colon. You can take non-aspirin pain relievers that contain acetaminophen (Tylenol).  If you are having sedation, we require a responsible adult to take you home for your safety. The sedation medicines used to relax you during the procedure can impair your judgement and reaction time, and make you forgetful and possibly a little unsteady.  Do not drive, make any important decisions, or sign any legal documents for 24 hours after your procedure.    When will I get my test results?  You should have your procedure results and any lab results (if applicable) by letter, Indieg[employer]art message, or phone call within 2 weeks. If you have any questions, please call the doctor that referred you for the procedure.    How do I know if my colon is cleaned out?   After completing the bowel prep, your bowel movements should be all liquid and yellow. Your bowel movements will look similar to urine in the toilet. If there are pieces of stool (poop) in the toilet, or if you can't see to the bottom of the toilet, please call our office for advice. Call [phone] and ask to speak with a nurse.    Why is the Miralax bowel prep taken in several steps?   The stool is flushed out by a large wave of fluid going through the colon. Just sipping a large volume of the solution will not achieve the desired result.  Studies have shown that two smaller waves (or more in some cases) are better than one large one.      Why do I need to drink the magnesium citrate so close to the procedure arrival time?   The intestine continues to produce mucus and waste. Longer intervals between the prep and the exam can lead to less than desired results. However, the stomach must be empty at the time of the exam in order to allow safe sedation. Therefore, there should be nothing by mouth 2 hours before the exam is started.    What if I need to cancel or reschedule my procedure?  Contact our endoscopy scheduling team at [phone], option 2. Monday through Friday, 7:00am-5:00pm.

## 2024-08-09 NOTE — TELEPHONE ENCOUNTER
"Endoscopy Scheduling Screen    Have you had a positive Covid test in the last 14 days?  No    What is your communication preference for Instructions and/or Bowel Prep?   MyChart    What insurance is in the chart?  Other:  [employer]    Ordering/Referring Provider: ANNY KHAN   (If ordering provider performs procedure, schedule with ordering provider unless otherwise instructed. )    BMI: Estimated body mass index is 24.87 kg/m  as calculated from the following:    Height as of 8/6/24: 1.832 m (6' 0.13\").    Weight as of 8/6/24: 83.5 kg (184 lb).     Sedation Ordered  moderate sedation.   If patient BMI > 50 do not schedule in [facility].    If patient BMI > 45 do not schedule at [facility].    Are you taking methadone or Suboxone?  No    Have you had difficulties, pain, or discomfort during past endoscopy procedures?  No    Are you taking any prescription medications for pain 3 or more times per week?   NO, No RN review required.    Do you have a history of malignant hyperthermia?  No     Have you been diagnosed or told you have pulmonary hypertension?   No    Do you have an LVAD?  No    Have you been told you have moderate to severe sleep apnea?  No    Have you been told you have COPD, asthma, or any other lung disease?  No    Do you have any heart conditions?  No     Have you ever had or are you waiting for an organ transplant?  No. Continue scheduling, no site restrictions.    Have you had a stroke or transient ischemic attack (TIA aka \"mini stroke\" in the last 6 months?   No    Have you been diagnosed with or been told you have cirrhosis of the liver?   No    Are you currently on dialysis?   No    Do you need assistance transferring?   No    BMI: Estimated body mass index is 24.87 kg/m  as calculated from the following:    Height as of 8/6/24: 1.832 m (6' 0.13\").    Weight as of 8/6/24: 83.5 kg (184 lb).     Is patients BMI > 40 and scheduling location [facility]?  No    Do you take an injectable medication for weight loss or " diabetes (excluding insulin)?  No    Do you take the medication Naltrexone?  No    Do you take blood thinners?  No       Prep   Are you currently on dialysis or do you have chronic kidney disease?  No    Do you have a diagnosis of diabetes?  No    Do you have a diagnosis of cystic fibrosis (CF)?  No    On a regular basis do you go 3 -5 days between bowel movements?  No    BMI > 40?  No    Preferred Pharmacy:    [facility], [place]  [address] [place] [address]  Phone: [phone] Fax: [phone]      Final Scheduling Details     Procedure scheduled  Colonoscopy    Surgeon:  SUZIE     Date of procedure:  8/29/2024     Pre-OP / PAC:   No - Not required for this site.    Location  [facility] - Per order.    Sedation   MAC/Deep Sedation  [facility]      Patient Reminders:   You will receive a call from a Nurse to review instructions and health history.  This assessment must be completed prior to your procedure.  Failure to complete the Nurse assessment may result in the procedure being cancelled.      On the day of your procedure, please designate an adult(s) who can drive you home stay with you for the next 24 hours. The medicines used in the exam will make you sleepy. You will not be able to drive.      You cannot take public transportation, ride share services, or non-medical taxi service without a responsible caregiver.  Medical transport services are allowed with the requirement that a responsible caregiver will receive you at your destination.  We require that drivers and caregivers are confirmed prior to your procedure.

## 2024-08-12 NOTE — TELEPHONE ENCOUNTER
Standard Miralax Bowel Prep recommended due to standard bowel prep. Instructions were sent via letter.

## 2024-08-15 RX ORDER — SIMVASTATIN 10 MG
1 TABLET ORAL DAILY
COMMUNITY
Start: 2024-01-23 | End: 2024-08-29 | Stop reason: HOSPADM

## 2024-08-20 ENCOUNTER — OFFICE VISIT (OUTPATIENT)
Dept: PODIATRY | Facility: CLINIC | Age: 64
End: 2024-08-20
Payer: COMMERCIAL

## 2024-08-20 ENCOUNTER — HOSPITAL ENCOUNTER (OUTPATIENT)
Dept: CT IMAGING | Facility: CLINIC | Age: 64
Discharge: HOME | End: 2024-08-20
Attending: FAMILY MEDICINE | Admitting: FAMILY MEDICINE
Payer: COMMERCIAL

## 2024-08-20 ENCOUNTER — ANCILLARY PROCEDURE (OUTPATIENT)
Dept: GENERAL RADIOLOGY | Facility: CLINIC | Age: 64
End: 2024-08-20
Attending: PODIATRIST
Payer: COMMERCIAL

## 2024-08-20 VITALS
SYSTOLIC BLOOD PRESSURE: 119 MMHG | TEMPERATURE: 97.8 F | BODY MASS INDEX: 24.92 KG/M2 | DIASTOLIC BLOOD PRESSURE: 74 MMHG | HEIGHT: 72 IN | WEIGHT: 184 LBS

## 2024-08-20 DIAGNOSIS — M79.671 RIGHT FOOT PAIN: ICD-10-CM

## 2024-08-20 DIAGNOSIS — M20.31 HALLUX HAMMERTOE, RIGHT: ICD-10-CM

## 2024-08-20 DIAGNOSIS — F17.200 NICOTINE DEPENDENCE, UNCOMPLICATED, UNSPECIFIED NICOTINE PRODUCT TYPE: ICD-10-CM

## 2024-08-20 DIAGNOSIS — L85.9 HYPERKERATOSIS: Primary | ICD-10-CM

## 2024-08-20 PROCEDURE — 71271 CT THORAX LUNG CANCER SCR C-: CPT

## 2024-08-20 PROCEDURE — 73630 X-RAY EXAM OF FOOT: CPT | Mod: TC | Performed by: STUDENT IN AN ORGANIZED HEALTH CARE EDUCATION/TRAINING PROGRAM

## 2024-08-20 PROCEDURE — 99203 OFFICE O/P NEW LOW 30 MIN: CPT | Mod: 25 | Performed by: PODIATRIST

## 2024-08-20 PROCEDURE — 11055 PARING/CUTG B9 HYPRKER LES 1: CPT | Performed by: PODIATRIST

## 2024-08-20 NOTE — PROGRESS NOTES
HPI:  Daniel J Gellerman is a 64 year old male who is seen in consultation at the request of self.    Pt presents for eval of:   (Onset, Location, L/R, Character, Treatments, Injury if yes)    XR Right foot 8/20/2024    Patient complains of a painful right fifth toe.  There appears to be a lesion between the toes that causes pain limping guarding.  He oil that and that helped for couple of days but did not completely eliminate it.    Works as a unemployed housekeeping.     ROS:  10 point ROS neg other than the symptoms noted above in the HPI.    There is no problem list on file for this patient.      PAST MEDICAL HISTORY: No past medical history on file.     PAST SURGICAL HISTORY: No past surgical history on file.     MEDICATIONS:   Current Outpatient Medications:     multivitamin, therapeutic (THERA-VIT) TABS tablet, Take 1 tablet by mouth daily, Disp: , Rfl:     simvastatin (ZOCOR) 10 MG tablet, Take 1 tablet by mouth daily, Disp: , Rfl:     oxyCODONE-acetaminophen (PERCOCET) 5-325 MG tablet, Take 1 tablet by mouth every 6 hours as needed for severe pain (Patient not taking: Reported on 8/6/2024), Disp: 6 tablet, Rfl: 0     ALLERGIES:  No Known Allergies     SOCIAL HISTORY:   Social History     Socioeconomic History    Marital status:      Spouse name: Not on file    Number of children: Not on file    Years of education: Not on file    Highest education level: Not on file   Occupational History    Not on file   Tobacco Use    Smoking status: Every Day     Types: Cigarettes    Smokeless tobacco: Never   Substance and Sexual Activity    Alcohol use: Yes    Drug use: Yes     Types: Marijuana    Sexual activity: Not on file   Other Topics Concern    Not on file   Social History Narrative    Not on file     Social Determinants of Health     Financial Resource Strain: High Risk (8/6/2024)    Financial Resource Strain     Within the past 12 months, have you or your family members you live with been unable to get  utilities (heat, electricity) when it was really needed?: Yes   Food Insecurity: High Risk (8/6/2024)    Food Insecurity     Within the past 12 months, did you worry that your food would run out before you got money to buy more?: Yes     Within the past 12 months, did the food you bought just not last and you didn t have money to get more?: Yes   Transportation Needs: Low Risk  (8/6/2024)    Transportation Needs     Within the past 12 months, has lack of transportation kept you from medical appointments, getting your medicines, non-medical meetings or appointments, work, or from getting things that you need?: No   Physical Activity: Not on file   Stress: Not on file   Social Connections: Not on file   Interpersonal Safety: Low Risk  (8/6/2024)    Interpersonal Safety     Do you feel physically and emotionally safe where you currently live?: Yes     Within the past 12 months, have you been hit, slapped, kicked or otherwise physically hurt by someone?: No     Within the past 12 months, have you been humiliated or emotionally abused in other ways by your partner or ex-partner?: No   Housing Stability: High Risk (8/6/2024)    Housing Stability     Do you have housing? : Yes     Are you worried about losing your housing?: Yes        FAMILY HISTORY: No family history on file.     EXAM:Vitals: /74 (BP Location: Right arm, Patient Position: Sitting, Cuff Size: Adult Large)   Temp 97.8  F (36.6  C) (Temporal)   Ht 1.829 m (6')   Wt 83.5 kg (184 lb)   BMI 24.95 kg/m    BMI= Body mass index is 24.95 kg/m .    General appearance: Patient is alert and fully cooperative with history & exam.  No sign of distress is noted during the visit.     Psychiatric: Affect is pleasant & appropriate.  Patient appears motivated to improve health.     Respiratory: Breathing is regular & unlabored while sitting.     HEENT: Hearing is intact to spoken word.  Speech is clear.  No gross evidence of visual impairment that would impact  ambulation.     Vascular: DP & PT pulses are intact & regular bilaterally.  No significant edema or varicosities noted.  CFT and skin temperature is normal to both lower extremities.     Neurologic: Lower extremity sensation is intact to light touch.  No evidence of weakness or contracture in the lower extremities.  No evidence of neuropathy.    Dermatologic: Skin is intact to both lower extremities with adequate texture, turgor and tone about the integument.  No paronychia or evidence of soft tissue infection is noted.  Painful hyperkeratosis noted about the plantar medial right fifth toe.    Musculoskeletal: Patient is ambulatory without assistive device or brace.  Fourth and especially the fifth digit is hammered semirigid on the right foot.  On the distal medial aspect of the right fifth digit hammertoe there is a painful nucleated hyperkeratotic lesion.  Skin lines remain intact.  No bleeding upon debridement.    Radiographs: 3 views right foot 8/20/2024 demonstrate hammertoe of the right fifth toe.     ASSESSMENT:       ICD-10-CM    1. Hyperkeratosis  L85.9       2. 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      8/20/2024   Obtained and interpreted radiographs  Debrided the symptomatic hyperkeratosis sharply 1 lesion.  Written instructions on interdigital spacing as well as appropriate shoe gear to provide more room for the toes and no compression.  Also different shoes to reduce shearing load or gripping of the shoe.  Avoid slip on shoes such as sandals flip-flops a dudes slides as this will cause more contracture of his toes and more pressure against this area.  Also discussed debridement at home with personal kevin written instructions dispensed  Also discussed surgical arthroplasty of the joint if this remains chronically painful.  All questions were answered.  Follow-up if this becomes or remains symptomatic.      Alli Mendez DPM

## 2024-08-20 NOTE — PROGRESS NOTES
HPI:  Daniel J Gellerman is a 64 year old male who is seen in consultation at the request of self referred.      Pt presents for eval of:   (Onset, Location, L/R, Character, Treatments, Injury if yes)     Lesion between 4th and 5th toe.  Started a month or 2 ago.  Did put some kind of oil on it.   No injury    Works as a not working.

## 2024-08-20 NOTE — LETTER
8/20/2024      Daniel J Gellerman  [address] [facility] [address]      Dear Colleague,    Thank you for referring your patient, Daniel J Gellerman, to the [facility]. Please see a copy of my visit note below.    HPI:  Daniel J Gellerman is a 64 year old male who is seen in consultation at the request of self.    Pt presents for eval of:   (Onset, Location, L/R, Character, Treatments, Injury if yes)    XR Right foot 8/20/2024    Patient complains of a painful right fifth toe.  There appears to be a lesion between the toes that causes pain limping guarding.  He oil that and that helped for couple of days but did not completely eliminate it.    Works as a unemployed housekeeping.     ROS:  10 point ROS neg other than the symptoms noted above in the HPI.    There is no problem list on file for this patient.      PAST MEDICAL HISTORY: No past medical history on file.     PAST SURGICAL HISTORY: No past surgical history on file.     MEDICATIONS:   Current Outpatient Medications:      multivitamin, therapeutic (THERA-VIT) TABS tablet, Take 1 tablet by mouth daily, Disp: , Rfl:      simvastatin (ZOCOR) 10 MG tablet, Take 1 tablet by mouth daily, Disp: , Rfl:      oxyCODONE-acetaminophen (PERCOCET) 5-325 MG tablet, Take 1 tablet by mouth every 6 hours as needed for severe pain (Patient not taking: Reported on 8/6/2024), Disp: 6 tablet, Rfl: 0     ALLERGIES:  No Known Allergies     SOCIAL HISTORY:   Social History     Socioeconomic History     Marital status:      Spouse name: Not on file     Number of children: Not on file     Years of education: Not on file     Highest education level: Not on file   Occupational History     Not on file   Tobacco Use     Smoking status: Every Day     Types: Cigarettes     Smokeless tobacco: Never   Substance and Sexual Activity     Alcohol use: Yes     Drug use: Yes     Types: Marijuana     Sexual activity: Not on file   Other Topics Concern     Not on file    Social History Narrative     Not on file     Social Determinants of Health     Financial Resource Strain: High Risk (8/6/2024)    Financial Resource Strain      Within the past 12 months, have you or your family members you live with been unable to get utilities (heat, electricity) when it was really needed?: Yes   Food Insecurity: High Risk (8/6/2024)    Food Insecurity      Within the past 12 months, did you worry that your food would run out before you got money to buy more?: Yes      Within the past 12 months, did the food you bought just not last and you didn t have money to get more?: Yes   Transportation Needs: Low Risk  (8/6/2024)    Transportation Needs      Within the past 12 months, has lack of transportation kept you from medical appointments, getting your medicines, non-medical meetings or appointments, work, or from getting things that you need?: No   Physical Activity: Not on file   Stress: Not on file   Social Connections: Not on file   Interpersonal Safety: Low Risk  (8/6/2024)    Interpersonal Safety      Do you feel physically and emotionally safe where you currently live?: Yes      Within the past 12 months, have you been hit, slapped, kicked or otherwise physically hurt by someone?: No      Within the past 12 months, have you been humiliated or emotionally abused in other ways by your partner or ex-partner?: No   Housing Stability: High Risk (8/6/2024)    Housing Stability      Do you have housing? : Yes      Are you worried about losing your housing?: Yes        FAMILY HISTORY: No family history on file.     EXAM:Vitals: /74 (BP Location: Right arm, Patient Position: Sitting, Cuff Size: Adult Large)   Temp 97.8  F (36.6  C) (Temporal)   Ht 1.829 m (6')   Wt 83.5 kg (184 lb)   BMI 24.95 kg/m    BMI= Body mass index is 24.95 kg/m .    General appearance: Patient is alert and fully cooperative with history & exam.  No sign of distress is noted during the visit.     Psychiatric: Affect  is pleasant & appropriate.  Patient appears motivated to improve health.     Respiratory: Breathing is regular & unlabored while sitting.     HEENT: Hearing is intact to spoken word.  Speech is clear.  No gross evidence of visual impairment that would impact ambulation.     Vascular: DP & PT pulses are intact & regular bilaterally.  No significant edema or varicosities noted.  CFT and skin temperature is normal to both lower extremities.     Neurologic: Lower extremity sensation is intact to light touch.  No evidence of weakness or contracture in the lower extremities.  No evidence of neuropathy.    Dermatologic: Skin is intact to both lower extremities with adequate texture, turgor and tone about the integument.  No paronychia or evidence of soft tissue infection is noted.  Painful hyperkeratosis noted about the plantar medial right fifth toe.    Musculoskeletal: Patient is ambulatory without assistive device or brace.  Fourth and especially the fifth digit is hammered semirigid on the right foot.  On the distal medial aspect of the right fifth digit hammertoe there is a painful nucleated hyperkeratotic lesion.  Skin lines remain intact.  No bleeding upon debridement.    Radiographs: 3 views right foot 8/20/2024 demonstrate hammertoe of the right fifth toe.     ASSESSMENT:       ICD-10-CM    1. Hyperkeratosis  L85.9       2. Hallux hammertoe, right  M20.31            PLAN:  Reviewed patient's chart in [facility].      8/20/2024   Obtained and interpreted radiographs  Debrided the symptomatic hyperkeratosis sharply 1 lesion.  Written instructions on interdigital spacing as well as appropriate shoe gear to provide more room for the toes and no compression.  Also different shoes to reduce shearing load or gripping of the shoe.  Avoid slip on shoes such as sandals flip-flops a dudes slides as this will cause more contracture of his toes and more pressure against this area.  Also discussed debridement at home with personal  kevin written instructions dispensed  Also discussed surgical arthroplasty of the joint if this remains chronically painful.  All questions were answered.  Follow-up if this becomes or remains symptomatic.      Alli Mendez DPM      Again, thank you for allowing me to participate in the care of your patient.        Sincerely,        Alli Mendez DPM

## 2024-08-20 NOTE — PATIENT INSTRUCTIONS
Corn from a hammertoe. Reliable shoe stores: To maximize your experience and provide the best possible fit.  Be sure to show them your foot concerns and tell them Dr. Mendez sent you.      Stores listed in bold have only athletic shoes, and stores that are not bold are mostly casual or variety of shoes    [employer]  [address] [place] [address]  [phone]     [employer]  [address] [place] [address]  [phone]     [employer] [facility] [employer]alier  [address] [place] [address]  [phone]    [employer]  [address] [facility]  [facility] [address]  [phone]    [employer]  [address] [place] [address]  [phone]    [employer]  [address] [place] [address]  [phone]                         [employer] Locations:     [address] [place] [address]   [phone]     [address] [place] [address] [address] [place] [address]   [phone]     [address] [place] [address]   [phone]     [address] [facility] [place] [address]   [phone]     [address] [place] [address] [place] [address]   [phone]     [address] [place] [address]   [phone]     [address] [facility] [address]   [place] [address]   [phone]     [address] [facility]. [address]   [place], [place] [id number] [phone]             How to find reasonable shoes          The correct width    Correct Fitting    Correct Length      Foot Distortion    Posture Distortion                          Torsional Rigidity      Grasp behind the heel and underneath the foot and twist      Bad    Excessive torsion/twist in midfoot     Less torsion/twist in midfoot is better                   Heel Counter Rigidity      Grasp just above   midsole and squeeze      Bad    Soft heel counter      Good    Rigid Heel Counter      Flexion Rigidity      Grasp shoe and bend from forefoot to rearfoot                  Calluses, Corns, IPKs,  Porokeratosis    When there is excessive friction or pressure on the skin, the body responds by making the skin thicker.  While this may protect the deeper structures, the thickened skin can take up more space and thus increase pressure over a bony prominence or become an open sore or skin ulcer as this skin becomes less flexible.    Flat, diffuse thickening are simple calluses and they are usually caused by friction.  Often these are the result of rubbing on a shoe or going barefoot.    Calluses with a central core between the toes are called corns.  These result from prominent joints on adjacent toes rubbing together.  Theses are a symptom of bone malalignment and will always recur unless the underlying bones are addressed surgically.    Most of these lesions can be kept comfortable with routine maintenance. This consists of filing them with a Ped Egg, callus file, or 120 grit sandpaper on a block, every day during your bath or shower.  Most people prefer battery operated rodriguez such as an Amope', Personal Pedi and Emjoi for regular use or heavy painful callouses.  Heavy creams or ointments can be applied 1-2 times every day to keep them soft. Toe spacers can be used for corns, gel pads can be used for other lesions on the bottom of the foot. If there is a deformity noted, such as a prominent bone, often this can be addressed to minimize recurrence. However, sometimes the pressure and lesion simply migrates to another spot after surgery, so it is not a guaranteed cure.     www.pedifInnolight.com is the best source for all sorts of foot pads and cushions    If you have severe callouses and cracking, you may apply heavy greasy cream or ointments that you scoop up such as Cetaphil cream, Eucerin, Aquaphor or Vaseline.  Be sure to obtain cream or ointment in these brands and not lotion (lotion is water based and not durable enough for feet). For more aggressive help apply heavy creams or ointment under occlusive dressings  such as Saran Wrap or Jelly Feet while sleeping.   Jelly Feet can be obtained at www.[employer].com.     To be successful with managing hyperkeratotic skin, you must manage hygiene daily.  At your bath or shower time is the easiest time to work on this.  There is no medical or surgical treatment that will absolutely eliminate many of these and symptoms.    Please call with any additional questions.

## 2024-08-28 ENCOUNTER — ANESTHESIA EVENT (OUTPATIENT)
Dept: SURGERY | Facility: CLINIC | Age: 64
End: 2024-08-28
Payer: COMMERCIAL

## 2024-08-28 ASSESSMENT — LIFESTYLE VARIABLES: TOBACCO_USE: 1

## 2024-08-28 NOTE — ANESTHESIA PREPROCEDURE EVALUATION
Anesthesia Pre-Procedure Evaluation    Patient: Daniel J Gellerman   MRN: [id number] : 1960        Procedure : Procedure(s):  Colonoscopy          No past medical history on file.   No past surgical history on file.   No Known Allergies   Social History     Tobacco Use     Smoking status: Every Day     Types: Cigarettes     Smokeless tobacco: Never   Substance Use Topics     Alcohol use: Yes      Wt Readings from Last 1 Encounters:   24 83.5 kg (184 lb)        Anesthesia Evaluation   Pt has had prior anesthetic. Type: MAC and General.    No history of anesthetic complications       ROS/MED HX  ENT/Pulmonary:     (+)                tobacco use,                        Neurologic:  - neg neurologic ROS     Cardiovascular:  - neg cardiovascular ROS   (+)  - -   -  - -                                 Previous cardiac testing   Echo: Date: Results:    Stress Test:  Date: Results:    ECG Reviewed:  Date: 24 Results:  Sinus Rhythm   -Right bundle branch block with left axis -bifascicular block.  -Combined atrial enlargement.  Cath:  Date: Results:      METS/Exercise Tolerance: >4 METS    Hematologic:  - neg hematologic  ROS     Musculoskeletal:  - neg musculoskeletal ROS     GI/Hepatic:  - neg GI/hepatic ROS     Renal/Genitourinary:  - neg Renal ROS     Endo:  - neg endo ROS     Psychiatric/Substance Use:     (+)     Recreational drug usage: Cannabis.    Infectious Disease:  - neg infectious disease ROS     Malignancy:  - neg malignancy ROS     Other: Comment: 40 lb unintentional weight loss           Physical Exam    Airway  airway exam normal      Mallampati: II   TM distance: > 3 FB   Neck ROM: full   Mouth opening: > 3 cm    Respiratory Devices and Support         Dental  no notable dental history         Cardiovascular   cardiovascular exam normal       Rhythm and rate: regular and normal     Pulmonary   pulmonary exam normal        breath sounds clear to auscultation       OUTSIDE LABS:  CBC:   Lab  "Results   Component Value Date    WBC 5.6 08/06/2024    WBC 6.3 07/05/2024    HGB 17.4 08/06/2024    HGB 18.8 (H) 07/05/2024    HCT 49.4 08/06/2024    HCT 52.2 07/05/2024     08/06/2024     07/05/2024     BMP:   Lab Results   Component Value Date     08/06/2024     07/05/2024    POTASSIUM 4.9 08/06/2024    POTASSIUM 5.1 07/05/2024    CHLORIDE 101 08/06/2024    CHLORIDE 102 07/05/2024    CO2 26 08/06/2024    CO2 25 07/05/2024    BUN 12.0 08/06/2024    BUN 17.4 07/05/2024    CR 1.01 08/06/2024    CR 1.03 07/05/2024    GLC 94 08/06/2024     (H) 07/05/2024     COAGS: No results found for: \"PTT\", \"INR\", \"FIBR\"  POC: No results found for: \"BGM\", \"HCG\", \"HCGS\"  HEPATIC:   Lab Results   Component Value Date    ALBUMIN 4.5 08/06/2024    PROTTOTAL 8.0 08/06/2024    ALT 37 08/06/2024    AST 21 08/06/2024    ALKPHOS 83 08/06/2024    BILITOTAL 0.3 08/06/2024     OTHER:   Lab Results   Component Value Date    A1C 5.5 08/06/2024    SANDRA 9.7 08/06/2024    MAG 2.2 07/05/2024    TSH 3.86 08/06/2024       Anesthesia Plan    ASA Status:  2    NPO Status:  NPO Appropriate    Anesthesia Type: MAC.     - Reason for MAC: Deep or markedly invasive procedure (G8)   Induction: Intravenous.   Maintenance: TIVA.        Consents    Anesthesia Plan(s) and associated risks, benefits, and realistic alternatives discussed. Questions answered and patient/representative(s) expressed understanding.     - Discussed:     - Discussed with:  Patient      - Extended Intubation/Ventilatory Support Discussed: No.      - Patient is DNR/DNI Status: No          Postoperative Care    Pain management: Multi-modal analgesia.   PONV prophylaxis: Background Propofol Infusion     Comments:    Other Comments: The risks and benefits of anesthesia, and the alternatives where applicable, have been discussed with the patient, and they wish to proceed.           DEMETRIUS Serrano CRNA    I have reviewed the pertinent notes and labs in " the chart from the past 30 days and (re)examined the patient.  Any updates or changes from those notes are reflected in this note.

## 2024-08-29 ENCOUNTER — ANESTHESIA (OUTPATIENT)
Dept: SURGERY | Facility: CLINIC | Age: 64
End: 2024-08-29
Payer: COMMERCIAL

## 2024-09-16 ENCOUNTER — PATIENT OUTREACH (OUTPATIENT)
Dept: FAMILY MEDICINE | Facility: CLINIC | Age: 64
End: 2024-09-16
Payer: COMMERCIAL

## 2024-09-16 NOTE — TELEPHONE ENCOUNTER
Patient Quality Outreach    Patient is due for the following:   Colon Cancer Screening    Next Steps:   Schedule a office visit for colonoscopy    Type of outreach:    Sent letter.    Next Steps:  Reach out within 90 days via Phone and Letter.    Max number of attempts reached: No. Will try again in 90 days if patient still on fail list.    Questions for provider review:    None           Humble Parmar LPN   Chart routed to Care Team.

## 2024-09-16 NOTE — LETTER
September 16, 2024    To  Daniel J Gellerman  [address] [facility] [address]    Your team at [facility] cares about your health. We have reviewed your chart and based on our findings; we are making the following recommendations to better manage your health.     You are in particular need of attention regarding the following:     Colonoscopy, please reschedule as it looks like this was canceled.     If you have already completed these items, please contact the clinic via phone or   [employer] so your care team can review and update your records. Thank you for   choosing [facility] for your healthcare needs. For any questions,   concerns, or to schedule an appointment please contact our clinic.    Healthy Regards,      Your [facility] Care Team

## 2024-09-20 ENCOUNTER — TELEPHONE (OUTPATIENT)
Dept: GASTROENTEROLOGY | Facility: CLINIC | Age: 64
End: 2024-09-20
Payer: COMMERCIAL

## 2024-09-20 NOTE — TELEPHONE ENCOUNTER
Caller: Daniel J Gellerman     Reason for Reschedule/Cancellation   (please be detailed, any staff messages or encounters to note?): he never was able to do on 8/29 due to no ride or  was sick      Prior to reschedule please review:  Ordering Provider:     ANNY KHAN     Sedation Determined: mac site  Does patient have any ASC Exclusions, please identify?: n      Notes on Cancelled Procedure:  Procedure: Lower Endoscopy [Colonoscopy]   Date: 8/29  Location: [facility]; [address] [place] [address] [place] [address]  Surgeon: Vasyl      Rescheduled: Yes,   Procedure: Lower Endoscopy [Colonoscopy]    Date: 11/7   Location: [facility]; [address] [facility] [place] [address]   Surgeon: Jad   Sedation Level Scheduled  mac ,  Reason for Sedation Level site   Instructions updated and sent: y     Does patient need PAC or Pre -Op Rescheduled? : no       Did you cancel or rescheduled an EUS procedure? No.

## 2024-10-08 ENCOUNTER — TELEPHONE (OUTPATIENT)
Dept: ADMISSION | Facility: CLINIC | Age: 64
End: 2024-10-08
Payer: COMMERCIAL

## 2024-10-08 DIAGNOSIS — M20.42 HAMMER TOE OF LEFT FOOT: Primary | ICD-10-CM

## 2024-10-08 DIAGNOSIS — L85.9 HYPERKERATOSIS: ICD-10-CM

## 2024-10-08 NOTE — TELEPHONE ENCOUNTER
Last visit was 8/20/24. Order for orthotics has been pended. Please review and sign if appropriate.     Holly Lopez RN   [facility]

## 2024-10-08 NOTE — TELEPHONE ENCOUNTER
Reason for Call:  Other order for orthotics    Detailed comments: Mely called for Evin asking that a order for Orthotics be sent to the [facility] department.  They both feel that it would be very helpful for him.  He saw Dr Mendez in August.    Phone Number Patient can be reached at: Cell number on file:    Telephone Information:   Mobile [phone]       Best Time: anytime    Can we leave a detailed message on this number? YES    Call taken on 10/8/2024 at 3:31 PM by Dodie Oscar

## 2024-10-10 ENCOUNTER — OFFICE VISIT (OUTPATIENT)
Dept: FAMILY MEDICINE | Facility: CLINIC | Age: 64
End: 2024-10-10
Payer: COMMERCIAL

## 2024-10-10 VITALS
SYSTOLIC BLOOD PRESSURE: 128 MMHG | HEIGHT: 72 IN | DIASTOLIC BLOOD PRESSURE: 88 MMHG | WEIGHT: 197.8 LBS | RESPIRATION RATE: 20 BRPM | BODY MASS INDEX: 26.79 KG/M2 | TEMPERATURE: 98.1 F | OXYGEN SATURATION: 97 % | HEART RATE: 68 BPM

## 2024-10-10 DIAGNOSIS — Z00.00 ROUTINE GENERAL MEDICAL EXAMINATION AT A HEALTH CARE FACILITY: Primary | ICD-10-CM

## 2024-10-10 DIAGNOSIS — G89.29 CHRONIC RIGHT SHOULDER PAIN: ICD-10-CM

## 2024-10-10 DIAGNOSIS — D17.30 LIPOMA OF SKIN AND SUBCUTANEOUS TISSUE: ICD-10-CM

## 2024-10-10 DIAGNOSIS — M25.511 CHRONIC RIGHT SHOULDER PAIN: ICD-10-CM

## 2024-10-10 DIAGNOSIS — F17.200 NICOTINE DEPENDENCE, UNCOMPLICATED, UNSPECIFIED NICOTINE PRODUCT TYPE: ICD-10-CM

## 2024-10-10 DIAGNOSIS — M25.551 HIP PAIN, RIGHT: ICD-10-CM

## 2024-10-10 DIAGNOSIS — R06.83 SNORING: ICD-10-CM

## 2024-10-10 PROCEDURE — 99406 BEHAV CHNG SMOKING 3-10 MIN: CPT | Performed by: FAMILY MEDICINE

## 2024-10-10 PROCEDURE — 99213 OFFICE O/P EST LOW 20 MIN: CPT | Mod: 25 | Performed by: FAMILY MEDICINE

## 2024-10-10 PROCEDURE — 99396 PREV VISIT EST AGE 40-64: CPT | Performed by: FAMILY MEDICINE

## 2024-10-10 RX ORDER — NAPROXEN 500 MG/1
500 TABLET ORAL 2 TIMES DAILY PRN
Qty: 90 TABLET | Refills: 1 | Status: SHIPPED | OUTPATIENT
Start: 2024-10-10

## 2024-10-10 SDOH — HEALTH STABILITY: PHYSICAL HEALTH: ON AVERAGE, HOW MANY MINUTES DO YOU ENGAGE IN EXERCISE AT THIS LEVEL?: 30 MIN

## 2024-10-10 SDOH — HEALTH STABILITY: PHYSICAL HEALTH: ON AVERAGE, HOW MANY DAYS PER WEEK DO YOU ENGAGE IN MODERATE TO STRENUOUS EXERCISE (LIKE A BRISK WALK)?: 2 DAYS

## 2024-10-10 ASSESSMENT — PAIN SCALES - GENERAL: PAINLEVEL: MODERATE PAIN (5)

## 2024-10-10 ASSESSMENT — SOCIAL DETERMINANTS OF HEALTH (SDOH): HOW OFTEN DO YOU GET TOGETHER WITH FRIENDS OR RELATIVES?: NEVER

## 2024-10-10 NOTE — PATIENT INSTRUCTIONS
"Learning About Benefits of Quitting Smoking  Quitting smoking helps your body in many ways. Quitting lowers your risk for cancer, lung disease, heart attack, stroke, blood vessel disease, and blindness. You'll also get sick less often and heal faster. And after you quit, you may find that your mood is better and you are less stressed.  When and how will you feel healthier after quitting smoking?        In the first hours or days:   Your blood pressure and heart rate go down.  Your oxygen levels increase.        Within weeks or months:   You will cough less and breathe deeper. It may be easier to be active.  Your sense of taste and smell should return.        Over the years:   Your risks of heart disease, heart attack, and stroke are lower.  Your risk of lung cancer is cut by about half after about 10 years. And your risk for other cancers is lower too.  How would quitting help others in your life?    Their heart, lung, and cancer risks may drop, much like yours. They will also be sick less.   If you are or will be pregnant someday, quitting smoking means a healthier .   Where can you learn more?  Go to https://www.Tsukulink.net/patiented  Enter O319 in the search box to learn more about \"Learning About Benefits of Quitting Smoking.\"  Current as of: November 15, 2023  Content Version: 2024 [facility] [place].   Care instructions adapted under license by your healthcare professional. If you have questions about a medical condition or this instruction, always ask your healthcare professional. [employer] disclaims any warranty or liability for your use of this information.    "

## 2024-10-10 NOTE — PROGRESS NOTES
Preventive Care Visit  [facility]  Sonia Dang MD, Family Medicine  Oct 10, 2024      Assessment & Plan     (Z00.00) Routine general medical examination at a health care facility  (primary encounter diagnosis)  Comment: Patient is a 65 y/o M here for routine AWV with right shoulder and right hip pain. Labs done prior visit and wnl with exception of lipids. Otherwise vss today     (M25.511,  G89.29) Chronic right shoulder pain  Comment: Obtain xray. Hx of plain films years ago with arthritic changes likely same base don hx. Rec NSAID. Offered but declined Pt. Advised may need to fail conservative management prior to MRI being covered. Pt aware.   Plan: XR Shoulder Right G/E 3 Views, naproxen         (NAPROSYN) 500 MG tablet    (M25.551) Hip pain, right  Comment: Xray ordered.   Plan: XR Hip Right 2-3 Views, naproxen (NAPROSYN) 500        MG tablet    (D17.30) Lipoma of skin and subcutaneous tissue  Comment: Pt with lipoma on the posterior right arm. Advise dnormal variant.     (R06.83) Snoring  Comment: rec PSG and eval with sleep med. Pt is amenable. Snoring but no clear apneic events.   Plan: Adult Sleep Eval & Management          Referral    (F17.200) Nicotine dependence, uncomplicated, unspecified nicotine product type  Comment: Patient not interested in smoking cessation at this time.   Plan: SMOKING CESSATION COUNSELING 3-10 MIN      Patient has been advised of split billing requirements and indicates understanding: Yes        BMI  Estimated body mass index is 26.83 kg/m  as calculated from the following:    Height as of this encounter: 1.829 m (6').    Weight as of this encounter: 89.7 kg (197 lb 12.8 oz).       Counseling  Appropriate preventive services were addressed with this patient via screening, questionnaire, or discussion as appropriate for fall prevention, nutrition, physical activity, Tobacco-use cessation, social engagement, weight loss and cognition.   Checklist reviewing preventive services available has been given to the patient.  Reviewed patient's diet, addressing concerns and/or questions.   He is at risk for lack of exercise and has been provided with information to increase physical activity for the benefit of his well-being.   Patient is at risk for social isolation and has been provided with information about the benefit of social connection.   The patient was instructed to see the dentist every 6 months.   The patient reports drinking more than 3 alcoholic drinks per day and/or more than 7 drhnks per week. The patient was counseled and given information about possible harmful effects of excessive alcohol intake.      Yaw Cleary is a 64 year old, presenting for the following:  Physical, Mass (On arm), and Cough (A lot at night)        10/10/2024    12:59 PM   Additional Questions   Roomed by Rachna ROSENBERG        Health Care Directive  Patient does not have a Health Care Directive or Living Will: Discussed advance care planning with patient; however, patient declined at this time.    HPI  84-year-old male here for annual wellness visit and follow-up on lab testing and radiology from prior.  He does report that he has had waxing waning symptoms from 2 different joint pains.  He has right shoulder and right hip pain that wax and wane over time.  He was seen many years ago for pain in the shoulder after trauma with acute pain in the right shoulder.  At that time he had x-rays which were done which showed arthritic changes in his shoulder.  Similarly he has waxing waning symptoms in his right hip.  The symptoms are often worse when its cold and get better after movement and with warmer weather    Patient also worried re: a lump on the posterior right arm. Non painful, not bothersome but worried.   Pt also reports girlfriend told him he snores and wants him to have a sleep study. Has had no clear apneic events.     Labs and Lung CT reviewed with patient and  advised follow up in 12 mo prn sooner. Pt amenable to plan.         10/10/2024   General Health   How would you rate your overall physical health? (!) FAIR   Feel stress (tense, anxious, or unable to sleep) To some extent      (!) STRESS CONCERN      10/10/2024   Nutrition   Three or more servings of calcium each day? Yes   Diet: Regular (no restrictions)   How many servings of fruit and vegetables per day? (!) 2-3   How many sweetened beverages each day? (!) 3            10/10/2024   Exercise   Days per week of moderate/strenous exercise 2 days   Average minutes spent exercising at this level 30 min      (!) EXERCISE CONCERN      10/10/2024   Social Factors   Frequency of gathering with friends or relatives Never   Worry food won't last until get money to buy more No   Food not last or not have enough money for food? No   Do you have housing? (Housing is defined as stable permanent housing and does not include staying ouside in a car, in a tent, in an abandoned building, in an overnight shelter, or couch-surfing.) Yes   Are you worried about losing your housing? Yes   Lack of transportation? No   Unable to get utilities (heat,electricity)? No   Want help with housing or utility concern? (!) YES      (!) HOUSING CONCERN PRESENT(!) SOCIAL CONNECTIONS CONCERN      10/10/2024   Fall Risk   Fallen 2 or more times in the past year? No   Trouble with walking or balance? Yes   Gait Speed Test (Document in seconds) 3.5   Gait Speed Test Interpretation Less than or equal to 5.00 seconds - PASS             10/10/2024   Dental   Dentist two times every year? (!) NO            10/10/2024   TB Screening   Were you born outside of the [place]? No              Today's PHQ-2 Score:       10/10/2024     1:09 PM   PHQ-2 ( 1999 [employer])   Q1: Little interest or pleasure in doing things 0   Q2: Feeling down, depressed or hopeless 1   PHQ-2 Score 1         10/10/2024   Substance Use   If I could quit smoking, I would Neutral   I want to quit  somking, worry about health affects Neutral   Willing to make a plan to quit smoking Neutral   Willing to cut down before quitting Neutral   Alcohol more than 3/day or more than 7/wk Yes   How often do you have a drink containing alcohol 2 to 3 times a week   How many alcohol drinks on typical day 3 or 4   How often do you have 5+ drinks at one occasion Less than monthly   Audit 2/3 Score 2   How often not able to stop drinking once started Never   How often failed to do what normally expected Never   How often needed first drink in am after a heavy drinking session Never   How often feeling of guilt or remorse after drinking Never   How often unable to remember what happened the night before Never   Have you or someone else been injured because of your drinking No   Has anyone been concerned or suggested you cut down on drinking No   TOTAL SCORE - AUDIT 5   Do you use any other substances recreationally? No        Social History     Tobacco Use    Smoking status: Every Day     Current packs/day: 0.50     Types: Cigarettes    Smokeless tobacco: Never   Vaping Use    Vaping status: Never Used   Substance Use Topics    Alcohol use: Yes    Drug use: Yes     Types: Marijuana           10/10/2024   STI Screening   New sexual partner(s) since last STI/HIV test? No      Last PSA:   Prostate Specific Antigen Screen   Date Value Ref Range Status   2024 0.81 0.00 - 4.50 ng/mL Final     ASCVD Risk   The 10-year ASCVD risk score (Tadeo UREÑA, et al., 2019) is: 20.8%    Values used to calculate the score:      Age: 64 years      Sex: Male      Is Non- : No      Diabetic: No      Tobacco smoker: Yes      Systolic Blood Pressure: 128 mmHg      Is BP treated: No      HDL Cholesterol: 58 mg/dL      Total Cholesterol: 310 mg/dL    Fracture Risk Assessment Tool  Link to Frax Calculator  Use the information below to complete the Frax calculator  : 1960  Sex: male  Weight (kg): 89.7 kg (actual  weight)  Height (cm): 182.9 cm  Previous Fragility Fracture:  No  History of parent with fractured hip:  No  Current Smoking:  Yes  Patient has been on glucocorticoids for more than 3 months (5mg/day or more): No  Rheumatoid Arthritis on Problem List:  No  Secondary Osteoporosis on Problem List:  No  Consumes 3 or more units of alcohol per day: Yes  Femoral Neck BMD (g/cm2)           Reviewed and updated as needed this visit by Provider                    History reviewed. No pertinent past medical history.  History reviewed. No pertinent surgical history.    ROS negative with exception of what's noted above.      Objective    Exam  /88   Pulse 68   Temp 98.1  F (36.7  C) (Temporal)   Resp 20   Ht 1.829 m (6')   Wt 89.7 kg (197 lb 12.8 oz)   SpO2 97%   BMI 26.83 kg/m     Estimated body mass index is 26.83 kg/m  as calculated from the following:    Height as of this encounter: 1.829 m (6').    Weight as of this encounter: 89.7 kg (197 lb 12.8 oz).    Physical Exam  Constitutional:       General: He is not in acute distress.     Appearance: Normal appearance. He is not ill-appearing, toxic-appearing or diaphoretic.   HENT:      Head: Normocephalic.      Mouth/Throat:      Mouth: Mucous membranes are moist.   Eyes:      General: No scleral icterus.  Cardiovascular:      Rate and Rhythm: Normal rate and regular rhythm.      Pulses: Normal pulses.      Heart sounds: No murmur heard.  Pulmonary:      Effort: Pulmonary effort is normal. No respiratory distress.      Breath sounds: Normal breath sounds. No stridor. No wheezing, rhonchi or rales.   Skin:     General: Skin is warm and dry.      Capillary Refill: Capillary refill takes less than 2 seconds.   Neurological:      Mental Status: He is alert. Mental status is at baseline.   Psychiatric:         Mood and Affect: Mood normal.         Behavior: Behavior normal.         Thought Content: Thought content normal.         Judgment: Judgment normal.                Signed Electronically by: Sonia Dang MD

## 2024-10-29 ENCOUNTER — OFFICE VISIT (OUTPATIENT)
Dept: ORTHOPEDICS | Facility: CLINIC | Age: 64
End: 2024-10-29
Payer: COMMERCIAL

## 2024-10-29 ENCOUNTER — ANCILLARY PROCEDURE (OUTPATIENT)
Dept: GENERAL RADIOLOGY | Facility: CLINIC | Age: 64
End: 2024-10-29
Attending: ORTHOPAEDIC SURGERY
Payer: COMMERCIAL

## 2024-10-29 VITALS — WEIGHT: 197 LBS | HEIGHT: 72 IN | BODY MASS INDEX: 26.68 KG/M2 | RESPIRATION RATE: 18 BRPM

## 2024-10-29 DIAGNOSIS — G89.29 CHRONIC RIGHT SHOULDER PAIN: ICD-10-CM

## 2024-10-29 DIAGNOSIS — M19.011 PRIMARY OSTEOARTHRITIS OF RIGHT SHOULDER: ICD-10-CM

## 2024-10-29 DIAGNOSIS — M25.511 CHRONIC RIGHT SHOULDER PAIN: Primary | ICD-10-CM

## 2024-10-29 DIAGNOSIS — G89.29 CHRONIC RIGHT SHOULDER PAIN: Primary | ICD-10-CM

## 2024-10-29 DIAGNOSIS — M25.511 CHRONIC RIGHT SHOULDER PAIN: ICD-10-CM

## 2024-10-29 DIAGNOSIS — M25.819 SHOULDER IMPINGEMENT: ICD-10-CM

## 2024-10-29 PROCEDURE — 73030 X-RAY EXAM OF SHOULDER: CPT | Mod: TC | Performed by: RADIOLOGY

## 2024-10-29 PROCEDURE — 99244 OFF/OP CNSLTJ NEW/EST MOD 40: CPT | Performed by: ORTHOPAEDIC SURGERY

## 2024-10-29 NOTE — LETTER
10/29/2024      Daniel J Gellerman  [id number] [facility] [address]      Dear Colleague,    Thank you for referring your patient, Daniel J Gellerman, to the [facility]. Please see a copy of my visit note below.    Daniel J Gellerman is a 64 year old male who is seen in consultation at the request of Dr. Sonia Dang for chronic right shoulder pain.  He has had problems with this for several years.  It started in 2019 when he lifted a heavy object and felt a pulling sensation in the shoulder.  Pain got worse over the past 2 years gradually.  He describes occasional aching pain rated 4 out of 10.  He denies help immobilization makes it worse as he gets stiff.  He does take low-dose Advil    X-rays performed now and compared to 2021.  There is slight arthritic spurring on the inferior humeral neck seen on both x-rays.  Joint space is still well-preserved.  There is no high riding humerus.    History reviewed. No pertinent past medical history.    History reviewed. No pertinent surgical history.    History reviewed. No pertinent family history.    Social History     Socioeconomic History     Marital status:      Spouse name: Not on file     Number of children: Not on file     Years of education: Not on file     Highest education level: Not on file   Occupational History     Not on file   Tobacco Use     Smoking status: Every Day     Current packs/day: 0.50     Types: Cigarettes     Smokeless tobacco: Never   Vaping Use     Vaping status: Never Used   Substance and Sexual Activity     Alcohol use: Yes     Drug use: Yes     Types: Marijuana     Sexual activity: Not on file   Other Topics Concern     Not on file   Social History Narrative     Not on file     Social Drivers of Health     Financial Resource Strain: Low Risk  (10/10/2024)    Financial Resource Strain      Within the past 12 months, have you or your family members you live with been unable to get utilities (heat, electricity)  when it was really needed?: No   Recent Concern: Financial Resource Strain - High Risk (8/6/2024)    Financial Resource Strain      Within the past 12 months, have you or your family members you live with been unable to get utilities (heat, electricity) when it was really needed?: Yes   Food Insecurity: Low Risk  (10/10/2024)    Food Insecurity      Within the past 12 months, did you worry that your food would run out before you got money to buy more?: No      Within the past 12 months, did the food you bought just not last and you didn t have money to get more?: No   Recent Concern: Food Insecurity - High Risk (8/6/2024)    Food Insecurity      Within the past 12 months, did you worry that your food would run out before you got money to buy more?: Yes      Within the past 12 months, did the food you bought just not last and you didn t have money to get more?: Yes   Transportation Needs: Low Risk  (10/10/2024)    Transportation Needs      Within the past 12 months, has lack of transportation kept you from medical appointments, getting your medicines, non-medical meetings or appointments, work, or from getting things that you need?: No   Physical Activity: Insufficiently Active (10/10/2024)    Exercise Vital Sign      Days of Exercise per Week: 2 days      Minutes of Exercise per Session: 30 min   Stress: Stress Concern Present (10/10/2024)    [employer] [place] [employer] - Occupational Stress Questionnaire      Feeling of Stress : To some extent   Social Connections: Unknown (10/10/2024)    Social Connection and Isolation Panel [NHANES]      Frequency of Communication with Friends and Family: Not on file      Frequency of Social Gatherings with Friends and Family: Never      Attends Druze Services: Not on file      Active Member of Clubs or Organizations: Not on file      Attends Club or Organization Meetings: Not on file      Marital Status: Not on file   Interpersonal Safety: Low Risk  (10/10/2024)     Interpersonal Safety      Do you feel physically and emotionally safe where you currently live?: Yes      Within the past 12 months, have you been hit, slapped, kicked or otherwise physically hurt by someone?: No      Within the past 12 months, have you been humiliated or emotionally abused in other ways by your partner or ex-partner?: No   Housing Stability: High Risk (10/10/2024)    Housing Stability      Do you have housing? : Yes      Are you worried about losing your housing?: Yes       Current Outpatient Medications   Medication Sig Dispense Refill     multivitamin, therapeutic (THERA-VIT) TABS tablet Take 1 tablet by mouth daily       naproxen (NAPROSYN) 500 MG tablet Take 1 tablet (500 mg) by mouth 2 times daily as needed for moderate pain. 90 tablet 1       No Known Allergies    REVIEW OF SYSTEMS:  CONSTITUTIONAL:  NEGATIVE for fever, chills, change in weight, not feeling tired  SKIN:  NEGATIVE for worrisome rashes, no skin lumps, no skin ulcers and no non-healing wounds  EYES:  NEGATIVE for vision changes or irritation.  ENT/MOUTH:  NEGATIVE.  No hearing loss, no hoarseness, no difficulty swallowing.  RESP:  NEGATIVE. No cough or shortness of breath.  CV:  NEGATIVE for chest pain, palpitations or peripheral edema  GI:  NEGATIVE for nausea, abdominal pain, heartburn, or change in bowel habits  :  Negative. No dysuria, no hematuria  MUSCULOSKELETAL:  See HPI above  NEURO:  NEGATIVE . No headaches, no dizziness,  no numbness  ENDOCRINE:  NEGATIVE for temperature intolerance, skin/hair changes  HEME/ALLERGY/IMMUNE:  NEGATIVE for bleeding problems  PSYCHIATRIC:  NEGATIVE. no anxiety, no depression.     Exam:  Vitals: Resp 18   Ht 1.829 m (6')   Wt 89.4 kg (197 lb)   BMI 26.72 kg/m    BMI= Body mass index is 26.72 kg/m .  Constitutional:  healthy, alert and no distress  Neuro: Alert and Oriented x 3, no focal defects.  Psych: Affect normal   Respiratory: Breathing not labored.  Cardiovascular: normal  peripheral pulses  Lymph: no adenopathy  Skin: No rashes,worrisome lesions or skin problems  He has slightly decreased rotation on the right shoulder compared to the left.  He has equal flexion, but has some pain on the right with this.  He has no tenderness in the neck, subacromial space, AC joint.  He does have some right shoulder pain with empty can test.  He has no pain with resisted internal rotation, external rotation, abduction down at his side.  No pain with blocking test.  Sensation, motor and circulation are intact.    Assessment:  1. Mild right shoulder osteoarthritis.  2. Right shoulder impingement.    Plan: He will try Aleve 2 to 4/day.  He should work on range of motion is much as possible.  Return to clinic if this fails to try subacromial injection.  Could also consider glenohumeral injection at some time in the future.    Again, thank you for allowing me to participate in the care of your patient.        Sincerely,        Giuliano Donovan MD

## 2024-10-29 NOTE — PATIENT INSTRUCTIONS
Mild right shoulder osteoarthritis.    Try Aleve up to 4 tablets per day or ibuprofen up to 12 tablets per day.

## 2024-10-30 NOTE — PROGRESS NOTES
Daniel J Gellerman is a 64 year old male who is seen in consultation at the request of Dr. Sonia Dang for chronic right shoulder pain.  He has had problems with this for several years.  It started in 2019 when he lifted a heavy object and felt a pulling sensation in the shoulder.  Pain got worse over the past 2 years gradually.  He describes occasional aching pain rated 4 out of 10.  He denies help immobilization makes it worse as he gets stiff.  He does take low-dose Advil    X-rays performed now and compared to 2021.  There is slight arthritic spurring on the inferior humeral neck seen on both x-rays.  Joint space is still well-preserved.  There is no high riding humerus.    History reviewed. No pertinent past medical history.    History reviewed. No pertinent surgical history.    History reviewed. No pertinent family history.    Social History     Socioeconomic History    Marital status:      Spouse name: Not on file    Number of children: Not on file    Years of education: Not on file    Highest education level: Not on file   Occupational History    Not on file   Tobacco Use    Smoking status: Every Day     Current packs/day: 0.50     Types: Cigarettes    Smokeless tobacco: Never   Vaping Use    Vaping status: Never Used   Substance and Sexual Activity    Alcohol use: Yes    Drug use: Yes     Types: Marijuana    Sexual activity: Not on file   Other Topics Concern    Not on file   Social History Narrative    Not on file     Social Drivers of Health     Financial Resource Strain: Low Risk  (10/10/2024)    Financial Resource Strain     Within the past 12 months, have you or your family members you live with been unable to get utilities (heat, electricity) when it was really needed?: No   Recent Concern: Financial Resource Strain - High Risk (8/6/2024)    Financial Resource Strain     Within the past 12 months, have you or your family members you live with been unable to get utilities (heat,  electricity) when it was really needed?: Yes   Food Insecurity: Low Risk  (10/10/2024)    Food Insecurity     Within the past 12 months, did you worry that your food would run out before you got money to buy more?: No     Within the past 12 months, did the food you bought just not last and you didn t have money to get more?: No   Recent Concern: Food Insecurity - High Risk (8/6/2024)    Food Insecurity     Within the past 12 months, did you worry that your food would run out before you got money to buy more?: Yes     Within the past 12 months, did the food you bought just not last and you didn t have money to get more?: Yes   Transportation Needs: Low Risk  (10/10/2024)    Transportation Needs     Within the past 12 months, has lack of transportation kept you from medical appointments, getting your medicines, non-medical meetings or appointments, work, or from getting things that you need?: No   Physical Activity: Insufficiently Active (10/10/2024)    Exercise Vital Sign     Days of Exercise per Week: 2 days     Minutes of Exercise per Session: 30 min   Stress: Stress Concern Present (10/10/2024)    [employer] [place] [employer] Occupational [employer] - Occupational Stress Questionnaire     Feeling of Stress : To some extent   Social Connections: Unknown (10/10/2024)    Social Connection and Isolation Panel [NHANES]     Frequency of Communication with Friends and Family: Not on file     Frequency of Social Gatherings with Friends and Family: Never     Attends Yazidi Services: Not on file     Active Member of Clubs or Organizations: Not on file     Attends Club or Organization Meetings: Not on file     Marital Status: Not on file   Interpersonal Safety: Low Risk  (10/10/2024)    Interpersonal Safety     Do you feel physically and emotionally safe where you currently live?: Yes     Within the past 12 months, have you been hit, slapped, kicked or otherwise physically hurt by someone?: No     Within the past 12 months, have you  been humiliated or emotionally abused in other ways by your partner or ex-partner?: No   Housing Stability: High Risk (10/10/2024)    Housing Stability     Do you have housing? : Yes     Are you worried about losing your housing?: Yes       Current Outpatient Medications   Medication Sig Dispense Refill    multivitamin, therapeutic (THERA-VIT) TABS tablet Take 1 tablet by mouth daily      naproxen (NAPROSYN) 500 MG tablet Take 1 tablet (500 mg) by mouth 2 times daily as needed for moderate pain. 90 tablet 1       No Known Allergies    REVIEW OF SYSTEMS:  CONSTITUTIONAL:  NEGATIVE for fever, chills, change in weight, not feeling tired  SKIN:  NEGATIVE for worrisome rashes, no skin lumps, no skin ulcers and no non-healing wounds  EYES:  NEGATIVE for vision changes or irritation.  ENT/MOUTH:  NEGATIVE.  No hearing loss, no hoarseness, no difficulty swallowing.  RESP:  NEGATIVE. No cough or shortness of breath.  CV:  NEGATIVE for chest pain, palpitations or peripheral edema  GI:  NEGATIVE for nausea, abdominal pain, heartburn, or change in bowel habits  :  Negative. No dysuria, no hematuria  MUSCULOSKELETAL:  See HPI above  NEURO:  NEGATIVE . No headaches, no dizziness,  no numbness  ENDOCRINE:  NEGATIVE for temperature intolerance, skin/hair changes  HEME/ALLERGY/IMMUNE:  NEGATIVE for bleeding problems  PSYCHIATRIC:  NEGATIVE. no anxiety, no depression.     Exam:  Vitals: Resp 18   Ht 1.829 m (6')   Wt 89.4 kg (197 lb)   BMI 26.72 kg/m    BMI= Body mass index is 26.72 kg/m .  Constitutional:  healthy, alert and no distress  Neuro: Alert and Oriented x 3, no focal defects.  Psych: Affect normal   Respiratory: Breathing not labored.  Cardiovascular: normal peripheral pulses  Lymph: no adenopathy  Skin: No rashes,worrisome lesions or skin problems  He has slightly decreased rotation on the right shoulder compared to the left.  He has equal flexion, but has some pain on the right with this.  He has no tenderness in  the neck, subacromial space, AC joint.  He does have some right shoulder pain with empty can test.  He has no pain with resisted internal rotation, external rotation, abduction down at his side.  No pain with blocking test.  Sensation, motor and circulation are intact.    Assessment:  1. Mild right shoulder osteoarthritis.  2. Right shoulder impingement.    Plan: He will try Aleve 2 to 4/day.  He should work on range of motion is much as possible.  Return to clinic if this fails to try subacromial injection.  Could also consider glenohumeral injection at some time in the future.

## 2024-11-06 RX ORDER — ONDANSETRON 2 MG/ML
4 INJECTION INTRAMUSCULAR; INTRAVENOUS EVERY 6 HOURS PRN
Status: CANCELLED | OUTPATIENT
Start: 2024-11-06

## 2024-11-06 RX ORDER — NALOXONE HYDROCHLORIDE 0.4 MG/ML
0.4 INJECTION, SOLUTION INTRAMUSCULAR; INTRAVENOUS; SUBCUTANEOUS
Status: CANCELLED | OUTPATIENT
Start: 2024-11-06

## 2024-11-06 RX ORDER — ONDANSETRON 4 MG/1
4 TABLET, ORALLY DISINTEGRATING ORAL EVERY 6 HOURS PRN
Status: CANCELLED | OUTPATIENT
Start: 2024-11-06

## 2024-11-06 RX ORDER — NALOXONE HYDROCHLORIDE 0.4 MG/ML
0.2 INJECTION, SOLUTION INTRAMUSCULAR; INTRAVENOUS; SUBCUTANEOUS
Status: CANCELLED | OUTPATIENT
Start: 2024-11-06

## 2024-11-06 RX ORDER — PROCHLORPERAZINE MALEATE 5 MG/1
10 TABLET ORAL EVERY 6 HOURS PRN
Status: CANCELLED | OUTPATIENT
Start: 2024-11-06

## 2024-11-06 RX ORDER — FLUMAZENIL 0.1 MG/ML
0.2 INJECTION, SOLUTION INTRAVENOUS
Status: CANCELLED | OUTPATIENT
Start: 2024-11-06 | End: 2024-11-07

## 2024-11-07 ENCOUNTER — ANESTHESIA (OUTPATIENT)
Dept: GASTROENTEROLOGY | Facility: CLINIC | Age: 64
End: 2024-11-07
Payer: COMMERCIAL

## 2024-11-07 ENCOUNTER — ANESTHESIA EVENT (OUTPATIENT)
Dept: GASTROENTEROLOGY | Facility: CLINIC | Age: 64
End: 2024-11-07
Payer: COMMERCIAL

## 2024-11-07 ENCOUNTER — HOSPITAL ENCOUNTER (OUTPATIENT)
Facility: CLINIC | Age: 64
Discharge: HOME | End: 2024-11-07
Attending: SURGERY | Admitting: SURGERY
Payer: COMMERCIAL

## 2024-11-07 VITALS
SYSTOLIC BLOOD PRESSURE: 122 MMHG | HEART RATE: 82 BPM | OXYGEN SATURATION: 95 % | TEMPERATURE: 97.1 F | RESPIRATION RATE: 15 BRPM | DIASTOLIC BLOOD PRESSURE: 90 MMHG

## 2024-11-07 LAB — COLONOSCOPY: NORMAL

## 2024-11-07 PROCEDURE — 250N000011 HC RX IP 250 OP 636: Performed by: NURSE ANESTHETIST, CERTIFIED REGISTERED

## 2024-11-07 PROCEDURE — 370N000017 HC ANESTHESIA TECHNICAL FEE, PER MIN: Performed by: SURGERY

## 2024-11-07 PROCEDURE — 88305 TISSUE EXAM BY PATHOLOGIST: CPT | Mod: TC | Performed by: SURGERY

## 2024-11-07 PROCEDURE — 45385 COLONOSCOPY W/LESION REMOVAL: CPT | Mod: PT | Performed by: SURGERY

## 2024-11-07 PROCEDURE — 88305 TISSUE EXAM BY PATHOLOGIST: CPT | Mod: 26 | Performed by: PATHOLOGY

## 2024-11-07 PROCEDURE — 250N000009 HC RX 250: Performed by: NURSE ANESTHETIST, CERTIFIED REGISTERED

## 2024-11-07 RX ORDER — ONDANSETRON 2 MG/ML
4 INJECTION INTRAMUSCULAR; INTRAVENOUS
Status: DISCONTINUED | OUTPATIENT
Start: 2024-11-07 | End: 2024-11-07 | Stop reason: HOSPADM

## 2024-11-07 RX ORDER — PROPOFOL 10 MG/ML
INJECTION, EMULSION INTRAVENOUS PRN
Status: DISCONTINUED | OUTPATIENT
Start: 2024-11-07 | End: 2024-11-07

## 2024-11-07 RX ORDER — PROPOFOL 10 MG/ML
INJECTION, EMULSION INTRAVENOUS CONTINUOUS PRN
Status: DISCONTINUED | OUTPATIENT
Start: 2024-11-07 | End: 2024-11-07

## 2024-11-07 RX ORDER — LIDOCAINE HYDROCHLORIDE 20 MG/ML
INJECTION, SOLUTION INFILTRATION; PERINEURAL PRN
Status: DISCONTINUED | OUTPATIENT
Start: 2024-11-07 | End: 2024-11-07

## 2024-11-07 RX ORDER — LIDOCAINE 40 MG/G
CREAM TOPICAL
Status: DISCONTINUED | OUTPATIENT
Start: 2024-11-07 | End: 2024-11-07 | Stop reason: HOSPADM

## 2024-11-07 RX ADMIN — LIDOCAINE HYDROCHLORIDE 50 MG: 20 INJECTION, SOLUTION INFILTRATION; PERINEURAL at 13:25

## 2024-11-07 RX ADMIN — PROPOFOL 150 MG: 10 INJECTION, EMULSION INTRAVENOUS at 13:25

## 2024-11-07 RX ADMIN — PROPOFOL 200 MCG/KG/MIN: 10 INJECTION, EMULSION INTRAVENOUS at 13:25

## 2024-11-07 ASSESSMENT — LIFESTYLE VARIABLES: TOBACCO_USE: 1

## 2024-11-07 ASSESSMENT — ACTIVITIES OF DAILY LIVING (ADL)
ADLS_ACUITY_SCORE: 0
ADLS_ACUITY_SCORE: 0

## 2024-11-07 NOTE — ANESTHESIA PREPROCEDURE EVALUATION
Anesthesia Pre-Procedure Evaluation    Patient: Daniel J Gellerman   MRN: [id number] : 1960        Procedure : Procedure(s):  Colonoscopy          No past medical history on file.   History reviewed. No pertinent surgical history.   No Known Allergies   Social History     Tobacco Use    Smoking status: Every Day     Current packs/day: 0.50     Types: Cigarettes    Smokeless tobacco: Never   Substance Use Topics    Alcohol use: Yes      Wt Readings from Last 1 Encounters:   10/29/24 89.4 kg (197 lb)        Anesthesia Evaluation   Pt has had prior anesthetic. Type: MAC and General.    No history of anesthetic complications       ROS/MED HX  ENT/Pulmonary:     (+)                tobacco use, Current use,                       Neurologic:  - neg neurologic ROS     Cardiovascular:  - neg cardiovascular ROS   (+)  - -   -  - -                                 Previous cardiac testing   Echo: Date: Results:    Stress Test:  Date: Results:    ECG Reviewed:  Date: 24 Results:  Sinus Rhythm   -Right bundle branch block with left axis -bifascicular block.  -Combined atrial enlargement.  Cath:  Date: Results:      METS/Exercise Tolerance: >4 METS    Hematologic:  - neg hematologic  ROS     Musculoskeletal:  - neg musculoskeletal ROS     GI/Hepatic:     (+)        bowel prep,            Renal/Genitourinary:  - neg Renal ROS     Endo:  - neg endo ROS     Psychiatric/Substance Use:     (+)     Recreational drug usage: Cannabis.    Infectious Disease:  - neg infectious disease ROS     Malignancy:  - neg malignancy ROS     Other: Comment: 40 lb unintentional weight loss           Physical Exam    Airway  airway exam normal      Mallampati: II   TM distance: > 3 FB   Neck ROM: full   Mouth opening: > 3 cm    Respiratory Devices and Support         Dental  no notable dental history         Cardiovascular   cardiovascular exam normal       Rhythm and rate: regular and normal     Pulmonary   pulmonary exam normal        breath  "sounds clear to auscultation         OUTSIDE LABS:  CBC:   Lab Results   Component Value Date    WBC 5.6 08/06/2024    WBC 6.3 07/05/2024    HGB 17.4 08/06/2024    HGB 18.8 (H) 07/05/2024    HCT 49.4 08/06/2024    HCT 52.2 07/05/2024     08/06/2024     07/05/2024     BMP:   Lab Results   Component Value Date     08/06/2024     07/05/2024    POTASSIUM 4.9 08/06/2024    POTASSIUM 5.1 07/05/2024    CHLORIDE 101 08/06/2024    CHLORIDE 102 07/05/2024    CO2 26 08/06/2024    CO2 25 07/05/2024    BUN 12.0 08/06/2024    BUN 17.4 07/05/2024    CR 1.01 08/06/2024    CR 1.03 07/05/2024    GLC 94 08/06/2024     (H) 07/05/2024     COAGS: No results found for: \"PTT\", \"INR\", \"FIBR\"  POC: No results found for: \"BGM\", \"HCG\", \"HCGS\"  HEPATIC:   Lab Results   Component Value Date    ALBUMIN 4.5 08/06/2024    PROTTOTAL 8.0 08/06/2024    ALT 37 08/06/2024    AST 21 08/06/2024    ALKPHOS 83 08/06/2024    BILITOTAL 0.3 08/06/2024     OTHER:   Lab Results   Component Value Date    A1C 5.5 08/06/2024    SANDRA 9.7 08/06/2024    MAG 2.2 07/05/2024    TSH 3.86 08/06/2024       Anesthesia Plan    ASA Status:  2    NPO Status:  NPO Appropriate    Anesthesia Type: MAC.     - Reason for MAC: Deep or markedly invasive procedure (G8)   Induction: Intravenous.   Maintenance: TIVA.        Consents    Anesthesia Plan(s) and associated risks, benefits, and realistic alternatives discussed. Questions answered and patient/representative(s) expressed understanding.     - Discussed:     - Discussed with:  Patient      - Extended Intubation/Ventilatory Support Discussed: No.      - Patient is DNR/DNI Status: No          Postoperative Care    Pain management: Multi-modal analgesia.   PONV prophylaxis: Background Propofol Infusion     Comments:    Other Comments: The risks and benefits of anesthesia, and the alternatives where applicable, have been discussed with the patient, and they wish to proceed.             Sanya Rocha, " APRN CRNA    I have reviewed the pertinent notes and labs in the chart from the past 30 days and (re)examined the patient.  Any updates or changes from those notes are reflected in this note.               # Overweight: Estimated body mass index is 26.72 kg/m  as calculated from the following:    Height as of 10/29/24: 1.829 m (6').    Weight as of 10/29/24: 89.4 kg (197 lb).       Alone

## 2024-11-07 NOTE — ANESTHESIA CARE TRANSFER NOTE
Patient: Daniel J Gellerman    Procedure: Procedure(s):  COLONOSCOPY, FLEXIBLE, WITH LESION REMOVAL USING SNARE       Diagnosis: Screen for colon cancer [Z12.11]  Diagnosis Additional Information: No value filed.    Anesthesia Type:   MAC     Note:    Oropharynx: oropharynx clear of all foreign objects and spontaneously breathing  Level of Consciousness: drowsy  Oxygen Supplementation: room air    Independent Airway: airway patency satisfactory and stable  Dentition: dentition unchanged  Vital Signs Stable: post-procedure vital signs reviewed and stable  Report to RN Given: handoff report given  Patient transferred to: Phase II    Handoff Report: Identifed the Patient, Identified the Reponsible Provider, Reviewed the pertinent medical history, Discussed the surgical course, Reviewed Intra-OP anesthesia mangement and issues during anesthesia, Set expectations for post-procedure period and Allowed opportunity for questions and acknowledgement of understanding      Vitals:  Vitals Value Taken Time   /80 11/07/24 1347   Temp     Pulse 75 11/07/24 1347   Resp 15 11/07/24 1347   SpO2 97 % 11/07/24 1347   Vitals shown include unfiled device data.    Electronically Signed By: DEMETRIUS Garza CRNA  November 7, 2024  1:48 PM

## 2024-11-07 NOTE — ANESTHESIA POSTPROCEDURE EVALUATION
Patient: Daniel J Gellerman    Procedure: Procedure(s):  COLONOSCOPY, FLEXIBLE, WITH LESION REMOVAL USING SNARE       Anesthesia Type:  MAC    Note:  Disposition: Outpatient   Postop Pain Control: Uneventful            Sign Out: Well controlled pain   PONV: No   Neuro/Psych: Uneventful            Sign Out: Acceptable/Baseline neuro status   Airway/Respiratory: Uneventful            Sign Out: Acceptable/Baseline resp. status   CV/Hemodynamics: Uneventful            Sign Out: Acceptable CV status   Other NRE: NONE   DID A NON-ROUTINE EVENT OCCUR? No    Event details/Postop Comments:  Pt was happy with anesthesia care.  No complications.  I will follow up with the pt if needed.           Last vitals:  Vitals Value Taken Time   /80 11/07/24 1347   Temp     Pulse 75 11/07/24 1347   Resp 15 11/07/24 1347   SpO2 97 % 11/07/24 1348   Vitals shown include unfiled device data.    Electronically Signed By: DEMETRIUS Garza CRNA  November 7, 2024  1:49 PM

## 2024-11-07 NOTE — DISCHARGE INSTRUCTIONS
[facility]    Home Care Following Endoscopy          Activity:  You have just undergone an endoscopic procedure usually performed with conscious sedation.  Do not work or operate machinery (including a car) for at least 12 hours.    I encourage you to walk and attempt to pass this air as soon as possible.    Diet:  Return to the diet you were on before your procedure but eat lightly for the first 12-24 hours.  Drink plenty of water.  Resume any regular medications unless otherwise advised by your physician.  Please begin any new medication prescribed as a result of your procedure as directed by your physician.   If you had any biopsy or polyp removed please refrain from aspirin or aspirin products for 2 days.  If on Coumadin please restart as instructed by your physician.   Pain:  You may take Tylenol as needed for pain.  Expected Recovery:  You can expect some mild abdominal fullness and/or discomfort due to the air used to inflate your intestinal tract.   Call Your Physician if You Have:    After Colonoscopy:  Worsening persisting abdominal pain which is worse with activity.  Fevers (>101 degrees F), chills or shakes.  Passage of continued blood with bowel movements.     Any questions or concerns about your recovery, please call [phone] or after hours [facility] ([phone]) Nurse Advice Line.    Follow-up Care:  You did have polyps/biopsy tissue sample(s) removed.  The polyps/biopsy tissue sample(s) will be sent to pathology.    You should receive letter in your My Chart with your results within 1-2 weeks. If you do not participate in My Chart a physical letter will come in the mail in 2-3 weeks.  Please call if you have not received a notification of your results.  If asked to return to clinic please make an appointment 1 week after your procedure.  Call [phone].

## 2024-11-07 NOTE — H&P
Patient seen for Endoscopy    HPI:  Patient is a 64 year old male here for endoscopy. Not taking blood thinning medications. No MI or CVA history. No issues with previous sedation. No recent acute illness.    Review Of Systems    Skin: negative  Ears/Nose/Throat: negative  Respiratory: No shortness of breath, dyspnea on exertion, cough, or hemoptysis  Cardiovascular: negative  Gastrointestinal: negative  Genitourinary: negative  Musculoskeletal: negative  Neurologic: negative  Hematologic/Lymphatic/Immunologic: negative  Endocrine: negative      History reviewed. No pertinent past medical history.    History reviewed. No pertinent surgical history.    History reviewed. No pertinent family history.    Social History     Socioeconomic History    Marital status:      Spouse name: Not on file    Number of children: Not on file    Years of education: Not on file    Highest education level: Not on file   Occupational History    Not on file   Tobacco Use    Smoking status: Every Day     Current packs/day: 0.50     Types: Cigarettes    Smokeless tobacco: Never   Vaping Use    Vaping status: Never Used   Substance and Sexual Activity    Alcohol use: Yes    Drug use: Yes     Types: Marijuana    Sexual activity: Not on file   Other Topics Concern    Not on file   Social History Narrative    Not on file     Social Drivers of Health     Financial Resource Strain: Low Risk  (10/10/2024)    Financial Resource Strain     Within the past 12 months, have you or your family members you live with been unable to get utilities (heat, electricity) when it was really needed?: No   Recent Concern: Financial Resource Strain - High Risk (8/6/2024)    Financial Resource Strain     Within the past 12 months, have you or your family members you live with been unable to get utilities (heat, electricity) when it was really needed?: Yes   Food Insecurity: Low Risk  (10/10/2024)    Food Insecurity     Within the past 12 months, did you worry  that your food would run out before you got money to buy more?: No     Within the past 12 months, did the food you bought just not last and you didn t have money to get more?: No   Recent Concern: Food Insecurity - High Risk (8/6/2024)    Food Insecurity     Within the past 12 months, did you worry that your food would run out before you got money to buy more?: Yes     Within the past 12 months, did the food you bought just not last and you didn t have money to get more?: Yes   Transportation Needs: Low Risk  (10/10/2024)    Transportation Needs     Within the past 12 months, has lack of transportation kept you from medical appointments, getting your medicines, non-medical meetings or appointments, work, or from getting things that you need?: No   Physical Activity: Insufficiently Active (10/10/2024)    Exercise Vital Sign     Days of Exercise per Week: 2 days     Minutes of Exercise per Session: 30 min   Stress: Stress Concern Present (10/10/2024)    [employer] [employer] - Occupational Stress Questionnaire     Feeling of Stress : To some extent   Social Connections: Unknown (10/10/2024)    Social Connection and Isolation Panel [NHANES]     Frequency of Communication with Friends and Family: Not on file     Frequency of Social Gatherings with Friends and Family: Never     Attends Holiness Services: Not on file     Active Member of Clubs or Organizations: Not on file     Attends Club or Organization Meetings: Not on file     Marital Status: Not on file   Interpersonal Safety: Low Risk  (11/7/2024)    Interpersonal Safety     Do you feel physically and emotionally safe where you currently live?: Yes     Within the past 12 months, have you been hit, slapped, kicked or otherwise physically hurt by someone?: No     Within the past 12 months, have you been humiliated or emotionally abused in other ways by your partner or ex-partner?: No   Housing Stability: High Risk (10/10/2024)    Housing Stability      Do you have housing? : Yes     Are you worried about losing your housing?: Yes       No current outpatient medications on file.       Medications and history reviewed    Physical exam:  Vitals: BP (!) 135/91   Pulse 95   Temp 97.1  F (36.2  C) (Temporal)   SpO2 95%   BMI= There is no height or weight on file to calculate BMI.    Constitutional: Healthy, alert, non-distressed   Head: Normo-cephalic, atraumatic, no lesions, masses or tenderness   Cardiovascular: RRR, no new murmurs, +S1, +S2 heart sounds, no clicks, rubs or gallops   Respiratory: CTAB, no rales, rhonchi or wheezing, equal chest rise, good respiratory effort   Gastrointestinal: Soft, non-tender, non distended, no rebound rigidity or guarding, no masses or hernias palpated   : Deferred  Musculoskeletal: Moves all extremities, normal  strength, no deformities noted   Skin: No suspicious lesions or rashes   Psychiatric: Mentation appears normal, affect appropriate   Hematologic/Lymphatic/Immunologic: Normal cervical and supraclavicular lymph nodes   Patient able to get up on table without difficulty.    Labs show:  No results found for this or any previous visit (from the past 24 hours).    Assessment: Endoscopy  Plan: Pt cleared for anesthesia for proposed procedure.    Jorje Street, DO

## 2024-11-07 NOTE — LETTER
Daniel J Gellerman  [id number] [facility] [id number]  November 13, 2024    Dear Anish,  This letter is to inform you of the results of your pathology report from your colonoscopy.  Your pathology report was:  Final Diagnosis   A: Large intestine, descending, polyp, biopsy/polypectomy:  - Tubular adenoma negative for high-grade dysplasia and malignancy.   B: Large intestine, rectum, polyp, biopsy/polypectomy:  - Hyperplastic polyp negative for dysplasia and malignancy.   These are benign polyps. These types of polyps do carry a small risk of developing into a cancer over time if not removed. Yours were completely removed at the time of your colonoscopy. You should have another surveillance colonoscopy in 7 years.  If you have further questions please don t hesitate to call our clinic at [phone].   Sincerely,     Jorje Street, DO

## 2024-11-11 LAB
PATH REPORT.COMMENTS IMP SPEC: NORMAL
PATH REPORT.COMMENTS IMP SPEC: NORMAL
PATH REPORT.FINAL DX SPEC: NORMAL
PATH REPORT.GROSS SPEC: NORMAL
PATH REPORT.MICROSCOPIC SPEC OTHER STN: NORMAL
PATH REPORT.RELEVANT HX SPEC: NORMAL
PHOTO IMAGE: NORMAL

## (undated) DEVICE — TUBING SUCTION 6"X3/16" N56A

## (undated) DEVICE — KIT ENDO TURNOVER/PROCEDURE CARRY-ON 101822

## (undated) DEVICE — ENDO SNARE EXACTO COLD 9MM LOOP 2.4MMX230CM 00711115

## (undated) DEVICE — SOL WATER IRRIG 1000ML BOTTLE 2F7114